# Patient Record
Sex: MALE | NOT HISPANIC OR LATINO | Employment: FULL TIME | URBAN - METROPOLITAN AREA
[De-identification: names, ages, dates, MRNs, and addresses within clinical notes are randomized per-mention and may not be internally consistent; named-entity substitution may affect disease eponyms.]

---

## 2017-03-15 ENCOUNTER — HOSPITAL ENCOUNTER (EMERGENCY)
Facility: CLINIC | Age: 42
Discharge: HOME | End: 2017-03-15
Attending: EMERGENCY MEDICINE | Admitting: EMERGENCY MEDICINE
Payer: COMMERCIAL

## 2017-03-15 ENCOUNTER — APPOINTMENT (OUTPATIENT)
Dept: GENERAL RADIOLOGY | Facility: CLINIC | Age: 42
End: 2017-03-15
Attending: EMERGENCY MEDICINE
Payer: COMMERCIAL

## 2017-03-15 VITALS
OXYGEN SATURATION: 98 % | RESPIRATION RATE: 18 BRPM | TEMPERATURE: 97.8 F | SYSTOLIC BLOOD PRESSURE: 128 MMHG | DIASTOLIC BLOOD PRESSURE: 92 MMHG | HEART RATE: 69 BPM

## 2017-03-15 DIAGNOSIS — M54.9 UPPER BACK PAIN: ICD-10-CM

## 2017-03-15 LAB
ALBUMIN SERPL-MCNC: 4.2 G/DL (ref 3.4–5)
ALP SERPL-CCNC: 81 U/L (ref 40–150)
ALT SERPL W P-5'-P-CCNC: 27 U/L (ref 0–70)
ANION GAP SERPL CALCULATED.3IONS-SCNC: 8 MMOL/L (ref 3–14)
AST SERPL W P-5'-P-CCNC: 15 U/L (ref 0–45)
BASOPHILS # BLD AUTO: 0 10E9/L (ref 0–0.2)
BASOPHILS NFR BLD AUTO: 0.3 %
BILIRUB SERPL-MCNC: 0.5 MG/DL (ref 0.2–1.3)
BUN SERPL-MCNC: 16 MG/DL (ref 7–30)
CALCIUM SERPL-MCNC: 8.6 MG/DL (ref 8.5–10.1)
CHLORIDE SERPL-SCNC: 104 MMOL/L (ref 94–109)
CO2 SERPL-SCNC: 29 MMOL/L (ref 20–32)
CREAT SERPL-MCNC: 0.72 MG/DL (ref 0.66–1.25)
D DIMER PPP FEU-MCNC: NORMAL UG/ML FEU (ref 0–0.5)
DIFFERENTIAL METHOD BLD: NORMAL
EOSINOPHIL # BLD AUTO: 0.2 10E9/L (ref 0–0.7)
EOSINOPHIL NFR BLD AUTO: 2.6 %
ERYTHROCYTE [DISTWIDTH] IN BLOOD BY AUTOMATED COUNT: 13 % (ref 10–15)
GFR SERPL CREATININE-BSD FRML MDRD: NORMAL ML/MIN/1.7M2
GLUCOSE SERPL-MCNC: 92 MG/DL (ref 70–99)
HCT VFR BLD AUTO: 45.5 % (ref 40–53)
HGB BLD-MCNC: 15.6 G/DL (ref 13.3–17.7)
IMM GRANULOCYTES # BLD: 0 10E9/L (ref 0–0.4)
IMM GRANULOCYTES NFR BLD: 0.3 %
INTERPRETATION ECG - MUSE: NORMAL
LYMPHOCYTES # BLD AUTO: 2 10E9/L (ref 0.8–5.3)
LYMPHOCYTES NFR BLD AUTO: 27.2 %
MCH RBC QN AUTO: 29.4 PG (ref 26.5–33)
MCHC RBC AUTO-ENTMCNC: 34.3 G/DL (ref 31.5–36.5)
MCV RBC AUTO: 86 FL (ref 78–100)
MONOCYTES # BLD AUTO: 0.5 10E9/L (ref 0–1.3)
MONOCYTES NFR BLD AUTO: 7.2 %
NEUTROPHILS # BLD AUTO: 4.7 10E9/L (ref 1.6–8.3)
NEUTROPHILS NFR BLD AUTO: 62.4 %
NRBC # BLD AUTO: 0 10*3/UL
NRBC BLD AUTO-RTO: 0 /100
PLATELET # BLD AUTO: 260 10E9/L (ref 150–450)
POTASSIUM SERPL-SCNC: 3.9 MMOL/L (ref 3.4–5.3)
PROT SERPL-MCNC: 8 G/DL (ref 6.8–8.8)
RBC # BLD AUTO: 5.3 10E12/L (ref 4.4–5.9)
SODIUM SERPL-SCNC: 141 MMOL/L (ref 133–144)
TROPONIN I SERPL-MCNC: NORMAL UG/L (ref 0–0.04)
WBC # BLD AUTO: 7.5 10E9/L (ref 4–11)

## 2017-03-15 PROCEDURE — 85025 COMPLETE CBC W/AUTO DIFF WBC: CPT | Performed by: EMERGENCY MEDICINE

## 2017-03-15 PROCEDURE — 93005 ELECTROCARDIOGRAM TRACING: CPT

## 2017-03-15 PROCEDURE — 71020 XR CHEST 2 VW: CPT

## 2017-03-15 PROCEDURE — 84484 ASSAY OF TROPONIN QUANT: CPT | Performed by: EMERGENCY MEDICINE

## 2017-03-15 PROCEDURE — 80053 COMPREHEN METABOLIC PANEL: CPT | Performed by: EMERGENCY MEDICINE

## 2017-03-15 PROCEDURE — 99285 EMERGENCY DEPT VISIT HI MDM: CPT

## 2017-03-15 PROCEDURE — 85379 FIBRIN DEGRADATION QUANT: CPT | Performed by: EMERGENCY MEDICINE

## 2017-03-15 RX ORDER — CYCLOBENZAPRINE HCL 10 MG
10 TABLET ORAL 3 TIMES DAILY PRN
Qty: 20 TABLET | Refills: 0 | Status: SHIPPED | OUTPATIENT
Start: 2017-03-15 | End: 2017-05-12

## 2017-03-15 ASSESSMENT — ENCOUNTER SYMPTOMS
BLOOD IN STOOL: 0
ARTHRALGIAS: 1
ABDOMINAL PAIN: 1
HEMATURIA: 0
SHORTNESS OF BREATH: 0
FEVER: 0
BACK PAIN: 1
DYSURIA: 0

## 2017-03-15 NOTE — ED AVS SNAPSHOT
Emergency Department    [address] [place] [address]    Phone:  [phone]    Fax:  [phone]                                       Tylor Mcgarry   MRN: [id number]    Department:   Emergency Department   Date of Visit:  3/15/2017           After Visit Summary Signature Page     I have received my discharge instructions, and my questions have been answered. I have discussed any challenges I see with this plan with the nurse or doctor.    ..........................................................................................................................................  Patient/Patient Representative Signature      ..........................................................................................................................................  Patient Representative Print Name and Relationship to Patient    ..................................................               ................................................  Date                                            Time    ..........................................................................................................................................  Reviewed by Signature/Title    ...................................................              ..............................................  Date                                                            Time

## 2017-03-15 NOTE — ED PROVIDER NOTES
"  History     Chief Complaint:  Back Pain    HPI:    Tylor Mcgarry is a 41 year old male with a history of hyperlipidemia and migraines who presents to the emergency department with back pain. The patient reports that he has recently been experiencing bilateral upper back pain, which occasionally radiates to his abdomen and upper extremities, for the past week.  He states that the pain worsens with excessive movement, extended periods of sitting, and when he coughs, sneezes, or breathes deeply. He has been taking ibuprofen for his back pain, with little relief. This continued back pain was concerning to him and prompted him to seek evaluation here in the emergency department.      Here in the ED, the patient continues to complain of \"10/10\" back pain.  The patient denies recent chest pain, fever, hematuria, dysuria, blood in his stool, shortness of breath, or trauma to his back. He notes that he is otherwise generally healthy.     Cardiac/PE/DVT Risk Factors:  The patient has a history of hyperlipidemia. He reports no family history of CAD and myocardial infarction. The patient denies any personal or familial history of PE, DVT, or clotting disorder. The patient reports no recent travel, surgery, or other immobilizations.     Allergies:  No known drug allergies      Medications:    Hydrocodone     Past Medical History:    Migraines  Hyperlipidemia    Past Surgical History:    History reviewed. No pertinent surgical history.     Family History:    Hyperlipidemia- Mother, Brother x3, Sister  CAD- Father  Hernias- Father  Liver Disease- Mother, Brother    Social History:  Smoking status: Never Smoker  Alcohol use: No   Marital Status:  Single      Review of Systems   Constitutional: Negative for fever.   Respiratory: Negative for shortness of breath.    Cardiovascular: Negative for chest pain.   Gastrointestinal: Positive for abdominal pain. Negative for blood in stool.   Genitourinary: Negative for dysuria " and hematuria.   Musculoskeletal: Positive for arthralgias and back pain.        Shoulders  Left arm   All other systems reviewed and are negative.      Physical Exam     Patient Vitals for the past 24 hrs:   BP Temp Temp src Pulse Heart Rate Resp SpO2   03/15/17 1644 (!) 128/92 97.8  F (36.6  C) Oral 69 69 18 98 %           Physical Exam:  SKIN:  Warm, dry.  HEMATOLOGIC/IMMUNOLOGIC/LYMPHATIC:  No pallor.  No limb edema.  HENT:  Painless ROM head and neck.  CARDIOVASCULAR:  Regular rate and rhythm.  No murmur.  RESPIRATORY:  No respiratory distress, breath sounds equal and normal.  Inspiration exacerbates the back pain.  GASTROINTESTINAL:  Soft, nontender abdomen.  No distension.  MUSCULOSKELETAL:  ROM torso exacerbates the upper bilateral back pain.  NEUROLOGIC:  Alert, conversant.  No gross motor or sensory deficit.    PSYCHIATRIC:  Normal mood.    Emergency Department Course   ECG:  Indication: Back Pain  Time: 1707  Vent. Rate 65 bpm. NC interval 162. QRS duration 104. QT/QTc 386/401. P-R-T axis 28 -11 -1.  Normal sinus rhythm. Possible Left atrial enlargement. Borderline ECG. Read time: 1715    Imaging:  Radiographic findings were communicated with the patient who voiced understanding of the findings.    XR Chest 2 views:   Clear Lungs As per radiology.     Laboratory:  CBC: WBC: 7.5, HGB: 15.6, PLT: 260  CMP: all WNL (Creatinine: 0.72)    1655 Troponin: <0.015   D Dimer: <0.3    Emergency Department Course:  Nursing notes and vitals reviewed. I performed an exam of the patient as documented above.     EKG obtained in the ED, see results above.     Blood drawn. This was sent to the lab for further testing, results above.    The patient was sent for a Chest XR while in the emergency department, findings above.     1738 I reevaluated the patient and provided an update in regards to his ED course.      Findings and plan explained to the Patient. Patient discharged home with instructions regarding supportive care,  medications, and reasons to return. The importance of close follow-up was reviewed. The patient was prescribed Flexeril.    I personally reviewed the laboratory results with the Patient and answered all related questions prior to discharge.     Impression & Plan    Medical Decision Making:  Tylor Mcgarry is a 41 year old male who has bilateral upper back pain, worsened by inspiration, cough, sneezing, and movement. A number of screening tests were performed regarding cardiopulmonary causes, all of which are negative. I advised to continued ibuprofen, otherwise use the prescribed Flexeril, use a heating pad, and otherwise follow up with primary doctor if not improving. Return if any new acute concerns.     Diagnosis:  1. Upper Back Pain (M54.9)    Disposition:  discharged to home    Discharge Medications:  New Prescriptions    CYCLOBENZAPRINE (FLEXERIL) 10 MG TABLET    Take 1 tablet (10 mg) by mouth 3 times daily as needed for muscle spasms       Allison Fletcher  3/15/2017    EMERGENCY DEPARTMENT    I, Allison Fletcher, am serving as a scribe on 3/15/2017 at 5:08 PM to personally document services performed by Felice Keith MD based on my observations and the provider's statements to me.      Felice Keith MD  03/15/17 1293

## 2017-03-15 NOTE — DISCHARGE INSTRUCTIONS
Relieving Tension in Your Back  Being relaxed helps keep your mind healthy and your back ready to move. Take short breaks often. Walk around. Stretch. Switch tasks. Also give the following a try.  Make time to relax. Start by setting aside 5 minutes daily.   Deep breathing    Deep breathing is a simple way to reduce stress. You can do it almost any time you need to relax.    Inhale slowly through your nose. Let your lungs and stomach expand.    Hold your breath for 2 to 3 seconds.    Exhale slowly through your mouth until your lungs feel empty. Repeat 3 to 4 times.  Relieve tension  Muscle tension can create tender spots called trigger points. The tips below may help relieve muscle tension.    Press the trigger point if you can reach it. If not, lie on a soft tennis ball, or ask a friend to press the spot. Use steady pressure for 10 to 15 seconds. Breathe deeply. Repeat a few times.    Massage trigger points with ice for 2 to 5 minutes. Press lightly at first. Slowly increase firmness.    0752-5577 [employer]. [address] [place] [address], [place] [address]. All rights reserved. This information is not intended as a substitute for professional medical care. Always follow your healthcare professional's instructions.

## 2017-03-15 NOTE — ED AVS SNAPSHOT
Emergency Department    [address] [place] [address]    Phone:  [phone]    Fax:  [phone]                                       Tylor Mcgarry   MRN: [id number]    Department:   Emergency Department   Date of Visit:  3/15/2017           Patient Information     Date Of Birth          1975        Your diagnoses for this visit were:     Upper back pain        You were seen by Robert Mckeon MD, Ignacio Dickson MD, and Felice Keith MD.      Follow-up Information     Please follow up.    Why:  continue ibuprofen for pain and try a heating pad - follow up with your MD if pain persists        Discharge Instructions         Relieving Tension in Your Back  Being relaxed helps keep your mind healthy and your back ready to move. Take short breaks often. Walk around. Stretch. Switch tasks. Also give the following a try.  Make time to relax. Start by setting aside 5 minutes daily.   Deep breathing    Deep breathing is a simple way to reduce stress. You can do it almost any time you need to relax.    Inhale slowly through your nose. Let your lungs and stomach expand.    Hold your breath for 2 to 3 seconds.    Exhale slowly through your mouth until your lungs feel empty. Repeat 3 to 4 times.  Relieve tension  Muscle tension can create tender spots called trigger points. The tips below may help relieve muscle tension.    Press the trigger point if you can reach it. If not, lie on a soft tennis ball, or ask a friend to press the spot. Use steady pressure for 10 to 15 seconds. Breathe deeply. Repeat a few times.    Massage trigger points with ice for 2 to 5 minutes. Press lightly at first. Slowly increase firmness.    6001-9149 [employer]. [address] [place] [address]. All rights reserved. This information is not intended as a substitute for professional medical care. Always follow your healthcare professional's instructions.          24  Hour Appointment Hotline       To make an appointment at any [facility], call [phone] ([phone]). If you don't have a family doctor or clinic, we will help you find one. [facility] are conveniently located to serve the needs of you and your family.             Review of your medicines      START taking        Dose / Directions Last dose taken    cyclobenzaprine 10 MG tablet   Commonly known as:  FLEXERIL   Dose:  10 mg   Quantity:  20 tablet        Take 1 tablet (10 mg) by mouth 3 times daily as needed for muscle spasms   Refills:  0                Prescriptions were sent or printed at these locations (1 Prescription)                   [facility] - [place], [place] - [address] AT [facility]   [address], [facility] [address]    Telephone:  [phone]   Fax:  [phone]   Hours:                  E-Prescribed (1 of 1)         cyclobenzaprine (FLEXERIL) 10 MG tablet                Procedures and tests performed during your visit     CBC with platelets differential    Comprehensive metabolic panel    D dimer quantitative    EKG 12-lead, tracing only    Troponin I    XR Chest 2 Views      Orders Needing Specimen Collection     None      Pending Results     Date and Time Order Name Status Description    3/15/2017 1649 XR Chest 2 Views Preliminary             Pending Culture Results     No orders found from 3/13/2017 to 3/16/2017.             Test Results from your hospital stay     3/15/2017  5:06 PM - Interface, ibeatyou Results      Component Results     Component Value Ref Range & Units Status    WBC 7.5 4.0 - 11.0 10e9/L Final    RBC Count 5.30 4.4 - 5.9 10e12/L Final    Hemoglobin 15.6 13.3 - 17.7 g/dL Final    Hematocrit 45.5 40.0 - 53.0 % Final    MCV 86 78 - 100 fl Final    MCH 29.4 26.5 - 33.0 pg Final    MCHC 34.3 31.5 - 36.5 g/dL Final    RDW 13.0 10.0 - 15.0 % Final    Platelet Count 260 150 - 450 10e9/L Final    Diff Method Automated  Method  Final    % Neutrophils 62.4 % Final    % Lymphocytes 27.2 % Final    % Monocytes 7.2 % Final    % Eosinophils 2.6 % Final    % Basophils 0.3 % Final    % Immature Granulocytes 0.3 % Final    Nucleated RBCs 0 0 /100 Final    Absolute Neutrophil 4.7 1.6 - 8.3 10e9/L Final    Absolute Lymphocytes 2.0 0.8 - 5.3 10e9/L Final    Absolute Monocytes 0.5 0.0 - 1.3 10e9/L Final    Absolute Eosinophils 0.2 0.0 - 0.7 10e9/L Final    Absolute Basophils 0.0 0.0 - 0.2 10e9/L Final    Abs Immature Granulocytes 0.0 0 - 0.4 10e9/L Final    Absolute Nucleated RBC 0.0  Final         3/15/2017  5:22 PM - Interface, Flexilab Results      Component Results     Component Value Ref Range & Units Status    D Dimer  0.0 - 0.50 ug/ml FEU Final    <0.3  This D-dimer assay is intended for use in conjuntion with a clinical pretest   probability assessment model to exclude pulmonary embolism (PE) and as an aid   in the diagnosis of deep venous thrombosis (DVT) in outpatients suspected of PE   or DVT. The cut-off value is 0.5 g/mL FEU.           3/15/2017  5:26 PM - Interface, Flexilab Results      Component Results     Component Value Ref Range & Units Status    Sodium 141 133 - 144 mmol/L Final    Potassium 3.9 3.4 - 5.3 mmol/L Final    Chloride 104 94 - 109 mmol/L Final    Carbon Dioxide 29 20 - 32 mmol/L Final    Anion Gap 8 3 - 14 mmol/L Final    Glucose 92 70 - 99 mg/dL Final    Urea Nitrogen 16 7 - 30 mg/dL Final    Creatinine 0.72 0.66 - 1.25 mg/dL Final    GFR Estimate >90  Non  GFR Calc   >60 mL/min/1.7m2 Final    GFR Estimate If Black >90   GFR Calc   >60 mL/min/1.7m2 Final    Calcium 8.6 8.5 - 10.1 mg/dL Final    Bilirubin Total 0.5 0.2 - 1.3 mg/dL Final    Albumin 4.2 3.4 - 5.0 g/dL Final    Protein Total 8.0 6.8 - 8.8 g/dL Final    Alkaline Phosphatase 81 40 - 150 U/L Final    ALT 27 0 - 70 U/L Final    AST 15 0 - 45 U/L Final         3/15/2017  5:27 PM - Interface, Flexilab Results       Component Results     Component Value Ref Range & Units Status    Troponin I ES  0.000 - 0.045 ug/L Final    <0.015  The 99th percentile for upper reference range is 0.045 ug/L.  Troponin values in   the range of 0.045 - 0.120 ug/L may be associated with risks of adverse   clinical events.           3/15/2017  5:38 PM - Interface, Radiant Ib      Narrative     CHEST TWO VIEW 3/15/2017 5:30 PM     COMPARISON: None.    HISTORY: Back pain, shortness of breath.    FINDINGS: The cardiac silhouette, pulmonary vasculature, lungs and  pleural spaces are within normal limits.        Impression     IMPRESSION: Clear lungs.                Clinical Quality Measure: Blood Pressure Screening     Your blood pressure was checked while you were in the emergency department today. The last reading we obtained was  BP: (!) 128/92 . Please read the guidelines below about what these numbers mean and what you should do about them.  If your systolic blood pressure (the top number) is less than 120 and your diastolic blood pressure (the bottom number) is less than 80, then your blood pressure is normal. There is nothing more that you need to do about it.  If your systolic blood pressure (the top number) is 120-139 or your diastolic blood pressure (the bottom number) is 80-89, your blood pressure may be higher than it should be. You should have your blood pressure rechecked within a year by a primary care provider.  If your systolic blood pressure (the top number) is 140 or greater or your diastolic blood pressure (the bottom number) is 90 or greater, you may have high blood pressure. High blood pressure is treatable, but if left untreated over time it can put you at risk for heart attack, stroke, or kidney failure. You should have your blood pressure rechecked by a primary care provider within the next 4 weeks.  If your provider in the emergency department today gave you specific instructions to follow-up with your doctor or provider even  "sooner than that, you should follow that instruction and not wait for up to 4 weeks for your follow-up visit.        Thank you for choosing [facility]       Thank you for choosing [facility] for your care. Our goal is always to provide you with excellent care. Hearing back from our patients is one way we can continue to improve our services. Please take a few minutes to complete the written survey that you may receive in the mail after you visit with us. Thank you!        [employer] Information     [employer] lets you send messages to your doctor, view your test results, renew your prescriptions, schedule appointments and more. To sign up, go to [email or web address] . Click on \"Log in\" on the left side of the screen, which will take you to the Welcome page. Then click on \"Sign up Now\" on the right side of the page.     You will be asked to enter the access code listed below, as well as some personal information. Please follow the directions to create your username and password.     Your access code is: [email or web address]  Expires: 2017  5:44 PM     Your access code will  in 90 days. If you need help or a new code, please call your [facility] clinic or [phone].        Care EveryWhere ID     This is your Care EveryWhere ID. This could be used by other organizations to access your [facility] medical records  [id number]        After Visit Summary       This is your record. Keep this with you and show to your community pharmacist(s) and doctor(s) at your next visit.                  "

## 2017-04-14 ENCOUNTER — OFFICE VISIT (OUTPATIENT)
Dept: INTERNAL MEDICINE | Facility: CLINIC | Age: 42
End: 2017-04-14
Payer: COMMERCIAL

## 2017-04-14 VITALS
DIASTOLIC BLOOD PRESSURE: 80 MMHG | BODY MASS INDEX: 32.78 KG/M2 | WEIGHT: 221.3 LBS | OXYGEN SATURATION: 98 % | TEMPERATURE: 97.6 F | HEIGHT: 69 IN | HEART RATE: 70 BPM | SYSTOLIC BLOOD PRESSURE: 112 MMHG

## 2017-04-14 DIAGNOSIS — Z00.00 ENCOUNTER FOR ROUTINE ADULT HEALTH EXAMINATION WITHOUT ABNORMAL FINDINGS: Primary | ICD-10-CM

## 2017-04-14 DIAGNOSIS — E78.5 HYPERLIPIDEMIA LDL GOAL <130: ICD-10-CM

## 2017-04-14 PROCEDURE — 99396 PREV VISIT EST AGE 40-64: CPT | Performed by: INTERNAL MEDICINE

## 2017-04-14 NOTE — LETTER
[facility]  [address] [address]  [phone]        April 19, 2018    Tylor Mcgarry  [address]  [facility] [address]              Dear Tylor Mcgarry    This is to remind you that your fasting labs is due.    You may call our office at [phone] to schedule an appointment.    Please disregard this notice if you have already had your labs drawn or made an appointment.        Sincerely,        Urban Stallings MD

## 2017-04-14 NOTE — PATIENT INSTRUCTIONS
"  *    5 GOALS TO PREVENT VASCULAR DISEASE:     1.  Aggressive blood pressure control, under 130/80 ideally.  Using medications if needed.    Your blood pressure is under good control    BP Readings from Last 4 Encounters:   04/14/17 112/80   03/15/17 (!) 128/92   10/20/16 112/78   04/15/16 108/76       2.  Aggressive LDL cholesterol (\"bad cholesterol\") lowering as indicated.    Your goal is an LDL under 130 for sure, preferably under 100.  (If you have diabetes or previous vascular disease, the the LDL goals would be under 100 for sure, preferably under 70.)    New guidelines identify four high-risk groups who could benefit from statins:   *people with pre-existing heart disease, such as those who have had a heart attack;   *people ages 40 to 75 who have diabetes of any type  *patients ages 40 to 75 with at least a 7.5% risk of developing cardiovascular disease over the next decade, according to a formula described in the guidelines  *patients with the sort of super-high cholesterol that sometimes runs in families, as evidenced by an LDL of 190 milligrams per deciliter or higher    Your cholesterol levels are well controlled.    Recent Labs   Lab Test  10/20/16   1014  03/17/16   1038   CHOL  228*  216*   HDL  51  55   LDL  161*  136*   TRIG  78  124       3.  Aggressive diabetic prevention, screening and/or management.      You do not have diabetes as of the most recent blood tests.     4.  No smoking    5.  Consider taking low dose aspirin (81 mg) tablet once per day OVER AGE 50.        Preventive Health Recommendations  Male Ages 40 to 49    Yearly exam:             See your health care provider every year in order to  o   Review health changes.   o   Discuss preventive care.    o   Review your medicines if your doctor has prescribed any.    You should be tested each year for STDs (sexually transmitted diseases) if you re at risk.     Have a cholesterol test every 5 years.     Have a colonoscopy (test for colon " "cancer) if someone in your family has had colon cancer or polyps before age 50.     After age 45, have a diabetes test (fasting glucose). If you are at risk for diabetes, you should have this test every 3 years.      Talk with your health care provider about whether or not a prostate cancer screening test (PSA) is right for you.    Shots: Get a flu shot each year. Get a tetanus shot every 10 years.     Nutrition:    Eat at least 5 servings of fruits and vegetables daily.     Eat whole-grain bread, whole-wheat pasta and brown rice instead of white grains and rice.     Talk to your provider about Calcium and Vitamin D.        --Good Grains:  Oats, brown rice, Quinoa (these do not raise the blood sugar as much)     --Bad grains:  Anything made from wheat or white rice     (because these raise the blood sugars significantly, and the possible gluten issue from wheat for some people).      --Proteins:  Aim for \"lean proteins\" including chicken, fish, seafood, pork, turkey, and eggs (in moderation); Eat red meat only occasionally          [place]:                  Lifestyle    Exercise for at least 150 minutes a week (30 minutes a day, 5 days a week). This will help you control your weight and prevent disease.     Limit alcohol to one drink per day.     No smoking.     Wear sunscreen to prevent skin cancer.     See your dentist every six months for an exam and cleaning.      "

## 2017-04-14 NOTE — NURSING NOTE
"Chief Complaint   Patient presents with     Physical       Initial /80  Pulse 70  Temp 97.6  F (36.4  C) (Oral)  Ht 5' 8.5\" (1.74 m)  Wt 221 lb 4.8 oz (100.4 kg)  SpO2 98%  BMI 33.16 kg/m2 Estimated body mass index is 33.16 kg/(m^2) as calculated from the following:    Height as of this encounter: 5' 8.5\" (1.74 m).    Weight as of this encounter: 221 lb 4.8 oz (100.4 kg).  Medication Reconciliation: complete    "

## 2017-04-14 NOTE — PROGRESS NOTES
SUBJECTIVE:     CC: Tylor Mcgarry is an 41 year old male who presents for preventative health visit.     Healthy Habits:    Do you get at least three servings of calcium containing foods daily (dairy, green leafy vegetables, etc.)? yes    Amount of exercise or daily activities, outside of work: 3 day(s) per week    Problems taking medications regularly not applicable    Medication side effects: No    Have you had an eye exam in the past two years? yes    Do you see a dentist twice per year? yes    Do you have sleep apnea, excessive snoring or daytime drowsiness?yes wakes up early for work            Today's PHQ-2 Score:   PHQ-2 ( 1999 Pf[employer]) 4/14/2017 10/20/2016   Q1: Little interest or pleasure in doing things 0 0   Q2: Feeling down, depressed or hopeless 0 0   PHQ-2 Score 0 0       Abuse: Current or Past(Physical, Sexual or Emotional)- No  Do you feel safe in your environment - Yes    Social History   Substance Use Topics     Smoking status: Never Smoker     Smokeless tobacco: Never Used     Alcohol use No     The patient does not drink >3 drinks per day nor >7 drinks per week.    Last PSA: No results found for: PSA    Recent Labs   Lab Test  10/20/16   1014  03/17/16   1038   CHOL  228*  216*   HDL  51  55   LDL  161*  136*   TRIG  78  124   NHDL  177*  161*       Reviewed orders with patient. Reviewed health maintenance and updated orders accordingly - Yes    Reviewed and updated as needed this visit by clinical staff  Tobacco  Allergies  Soc Hx        Reviewed and updated as needed this visit by Provider        Past Medical History:  ---------------------------  Past Medical History:   Diagnosis Date     Hyperlipidemia LDL goal <130        Past Surgical History:  ---------------------------  No past surgical history on file.    Current Medications:  ---------------------------  Current Outpatient Prescriptions   Medication Sig Dispense Refill     cyclobenzaprine (FLEXERIL) 10 MG tablet Take 1  "tablet (10 mg) by mouth 3 times daily as needed for muscle spasms 20 tablet 0       Allergies:  -------------  No Known Allergies    Social History:  -------------------  Social History     Social History     Marital status: Single     Spouse name: N/A     Number of children: N/A     Years of education: N/A     Occupational History     Not on file.     Social History Main Topics     Smoking status: Never Smoker     Smokeless tobacco: Never Used     Alcohol use No     Drug use: No     Sexual activity: Yes     Partners: Female     Other Topics Concern     Not on file     Social History Narrative       Family Medical History:  ------------------------------  Family History   Problem Relation Age of Onset     Hyperlipidemia Mother      Hyperlipidemia Brother      Hyperlipidemia Brother      Hyperlipidemia Brother      Hyperlipidemia Sister      Coronary Artery Disease Father 55     Prostate Problems Father      hernias     Liver Disease Mother      blanchard     Liver Disease Brother      blanchard        ROS:  C: NEGATIVE for fever, chills, change in weight  I: NEGATIVE for worrisome rashes, moles or lesions  E: NEGATIVE for vision changes or irritation  ENT: NEGATIVE for ear, mouth and throat problems  R: NEGATIVE for significant cough or SOB  CV: NEGATIVE for chest pain, palpitations or peripheral edema  GI: NEGATIVE for nausea, abdominal pain, heartburn, or change in bowel habits   male: negative for dysuria, hematuria, decreased urinary stream, erectile dysfunction, urethral discharge  M: NEGATIVE for significant arthralgias or myalgia  N: NEGATIVE for weakness, dizziness or paresthesias  P: NEGATIVE for changes in mood or affect      OBJECTIVE:     /80  Pulse 70  Temp 97.6  F (36.4  C) (Oral)  Ht 5' 8.5\" (1.74 m)  Wt 221 lb 4.8 oz (100.4 kg)  SpO2 98%  BMI 33.16 kg/m2  EXAM:  GENERAL alert and no distress.  EYES conjunctivae/corneas clear. PERRL, EOM's intact  HENT: NCAT,oral and posterior pharynx without " lesions or erythema, facies symmetric  NECK: Neck supple. No LAD, without thyroidmegaly or JVD.  RESP: Clear to ausculation bilaterally without wheezes or crackles. Normal BS in all fields.  CV: RRR normal S1S2 without  murmurs, rubs or gallops. PMI normal  LYMPH: no cervical lymph adenopathy appreciated  GI: NTND, no organomegaly, normal BS in all quadrants, without rebound or guarding  MS: No cyanosis, clubbing or edema noted bilaterally in Upper and/or Lower Extremities  SKIN: no significant ulcers, lesions or rashes on the visualized portions of the skin  NEURO: Alert and Oriented x 3, Gait normal. Reflexes normal and symmetric. Sensation grossly WNL..  PSYCH: Alert and oriented times 3; speech- coherent , normal rate and volume; able to articulate logical thoughts, able to abstract reason, no tangential thoughts, no hallucinations or delusions, affect- normal     ASSESSMENT/PLAN:       (Z00.00) Encounter for routine adult health examination without abnormal findings  (primary encounter diagnosis)  Comment: Discussed cardiac disease risk factor modification including screening for and treating HTN, lipids, DM, and smoking cessation.  Also discussed age appropriate cancer screening recommendations including testicular, prostate, colon and lung cancer as dictated by age group.  Recommended low fat, low salt diet and moderation in any alcohol intake.  Recommended always using seatbelts when in a car.  Recommended never driving after drinking or riding with someone who has been drinking as well.      Plan:     (E78.5) Hyperlipidemia LDL goal <130  Comment: Discussed current lipid results, previous results (if available) current guidelines (NCEP) for treatment and goals for lipids.  Discussed lifestyle modification, dietary changes (low fat, low simple carb) and regular aerobic exercise.  Discussed the link between dysmetabolic syndrome and impaired glucose tolerance seen in certain patterns of lipids.  Briefly  "discussed medication used for lipid lowering, including the statins are their possible side effects of myalgias, rhabdomyolysis, and liver toxicity.   Plan: Lipid Profile with reflex to direct LDL,         CANCELED: Lipid Profile with reflex to direct         LDL               COUNSELING:  Reviewed preventive health counseling, as reflected in patient instructions       Regular exercise       Healthy diet/nutrition       Vision screening       Hearing screening         reports that he has never smoked. He has never used smokeless tobacco.    Estimated body mass index is 33.16 kg/(m^2) as calculated from the following:    Height as of this encounter: 5' 8.5\" (1.74 m).    Weight as of this encounter: 221 lb 4.8 oz (100.4 kg).       Counseling Resources:  ATP IV Guidelines  Pooled Cohorts Equation Calculator  FRAX Risk Assessment  ICSI Preventive Guidelines  Dietary Guidelines for Americans, 2010  USDA's MyPlate  ASA Prophylaxis  Lung CA Screening    Urban Stallings MD  [facility]  "

## 2017-04-14 NOTE — MR AVS SNAPSHOT
"              After Visit Summary   4/14/2017    Tylor Mcgarry    MRN: [id number]           Patient Information     Date Of Birth          1975        Visit Information        Provider Department      4/14/2017 9:20 AM Urban Stallings MD [facility] Instructions      *    5 GOALS TO PREVENT VASCULAR DISEASE:     1.  Aggressive blood pressure control, under 130/80 ideally.  Using medications if needed.    Your blood pressure is under good control    BP Readings from Last 4 Encounters:   04/14/17 112/80   03/15/17 (!) 128/92   10/20/16 112/78   04/15/16 108/76       2.  Aggressive LDL cholesterol (\"bad cholesterol\") lowering as indicated.    Your goal is an LDL under 130 for sure, preferably under 100.  (If you have diabetes or previous vascular disease, the the LDL goals would be under 100 for sure, preferably under 70.)    New guidelines identify four high-risk groups who could benefit from statins:   *people with pre-existing heart disease, such as those who have had a heart attack;   *people ages 40 to 75 who have diabetes of any type  *patients ages 40 to 75 with at least a 7.5% risk of developing cardiovascular disease over the next decade, according to a formula described in the guidelines  *patients with the sort of super-high cholesterol that sometimes runs in families, as evidenced by an LDL of 190 milligrams per deciliter or higher    Your cholesterol levels are well controlled.    Recent Labs   Lab Test  10/20/16   1014  03/17/16   1038   CHOL  228*  216*   HDL  51  55   LDL  161*  136*   TRIG  78  124       3.  Aggressive diabetic prevention, screening and/or management.      You do not have diabetes as of the most recent blood tests.     4.  No smoking    5.  Consider taking low dose aspirin (81 mg) tablet once per day OVER AGE 50.        Preventive Health Recommendations  Male Ages 40 to 49    Yearly exam:             See your health care " "provider every year in order to  o   Review health changes.   o   Discuss preventive care.    o   Review your medicines if your doctor has prescribed any.    You should be tested each year for STDs (sexually transmitted diseases) if you re at risk.     Have a cholesterol test every 5 years.     Have a colonoscopy (test for colon cancer) if someone in your family has had colon cancer or polyps before age 50.     After age 45, have a diabetes test (fasting glucose). If you are at risk for diabetes, you should have this test every 3 years.      Talk with your health care provider about whether or not a prostate cancer screening test (PSA) is right for you.    Shots: Get a flu shot each year. Get a tetanus shot every 10 years.     Nutrition:    Eat at least 5 servings of fruits and vegetables daily.     Eat whole-grain bread, whole-wheat pasta and brown rice instead of white grains and rice.     Talk to your provider about Calcium and Vitamin D.        --Good Grains:  Oats, brown rice, Quinoa (these do not raise the blood sugar as much)     --Bad grains:  Anything made from wheat or white rice     (because these raise the blood sugars significantly, and the possible gluten issue from wheat for some people).      --Proteins:  Aim for \"lean proteins\" including chicken, fish, seafood, pork, turkey, and eggs (in moderation); Eat red meat only occasionally          Sb [place]:                  Lifestyle    Exercise for at least 150 minutes a week (30 minutes a day, 5 days a week). This will help you control your weight and prevent disease.     Limit alcohol to one drink per day.     No smoking.     Wear sunscreen to prevent skin cancer.     See your dentist every six months for an exam and cleaning.            Follow-ups after your visit        Who to contact     If you have questions or need follow up information about today's clinic visit or your schedule please contact [facility] directly at " "[phone].  Normal or non-critical lab and imaging results will be communicated to you by MyChart, letter or phone within 4 business days after the clinic has received the results. If you do not hear from us within 7 days, please contact the clinic through [employer] or phone. If you have a critical or abnormal lab result, we will notify you by phone as soon as possible.  Submit refill requests through [employer] or call your pharmacy and they will forward the refill request to us. Please allow 3 business days for your refill to be completed.          Additional Information About Your Visit        [employer] Information     [employer] lets you send messages to your doctor, view your test results, renew your prescriptions, schedule appointments and more. To sign up, go to [email or web address] . Click on \"Log in\" on the left side of the screen, which will take you to the Welcome page. Then click on \"Sign up Now\" on the right side of the page.     You will be asked to enter the access code listed below, as well as some personal information. Please follow the directions to create your username and password.     Your access code is: [email or web address]  Expires: 2017  5:44 PM     Your access code will  in 90 days. If you need help or a new code, please call your [place] clinic or [phone].        Care EveryWhere ID     This is your Care EveryWhere ID. This could be used by other organizations to access your [place] medical records  [id number]        Your Vitals Were     Pulse Temperature Height Pulse Oximetry BMI (Body Mass Index)       70 97.6  F (36.4  C) (Oral) 5' 8.5\" (1.74 m) 98% 33.16 kg/m2        Blood Pressure from Last 3 Encounters:   17 112/80   03/15/17 (!) 128/92   10/20/16 112/78    Weight from Last 3 Encounters:   17 221 lb 4.8 oz (100.4 kg)   10/20/16 226 lb 14.4 oz (102.9 kg)   04/15/16 222 lb 3.2 oz (100.8 kg)              Today, you had the following     No orders found for display    "    Primary Care Provider    None Specified       No primary provider on file.        Thank you!     Thank you for choosing [facility]  for your care. Our goal is always to provide you with excellent care. Hearing back from our patients is one way we can continue to improve our services. Please take a few minutes to complete the written survey that you may receive in the mail after your visit with us. Thank you!             Your Updated Medication List - Protect others around you: Learn how to safely use, store and throw away your medicines at www.disposemymeds.org.          This list is accurate as of: 4/14/17 10:36 AM.  Always use your most recent med list.                   Brand Name Dispense Instructions for use    cyclobenzaprine 10 MG tablet    FLEXERIL    20 tablet    Take 1 tablet (10 mg) by mouth 3 times daily as needed for muscle spasms

## 2017-04-15 DIAGNOSIS — E78.5 HYPERLIPEMIA: Primary | ICD-10-CM

## 2017-04-15 LAB
CHOLEST SERPL-MCNC: 248 MG/DL
HDLC SERPL-MCNC: 55 MG/DL
LDLC SERPL CALC-MCNC: 172 MG/DL
NONHDLC SERPL-MCNC: 193 MG/DL
TRIGL SERPL-MCNC: 106 MG/DL

## 2017-04-15 PROCEDURE — 80061 LIPID PANEL: CPT | Performed by: INTERNAL MEDICINE

## 2017-04-15 PROCEDURE — 36415 COLL VENOUS BLD VENIPUNCTURE: CPT | Performed by: INTERNAL MEDICINE

## 2017-05-03 ENCOUNTER — TELEPHONE (OUTPATIENT)
Dept: INTERNAL MEDICINE | Facility: CLINIC | Age: 42
End: 2017-05-03

## 2017-05-03 NOTE — TELEPHONE ENCOUNTER
Reason for Call:  Other call back    Detailed comments: Pt is requesting a letter for his work concerning his frequent bathroom use. His breaks are too far apart to cover his needs.  He has frequent urination and bowel movements.  Pt made an appt with Dr Stallings 5/12 to discuss this.  Pt would like to  the letter at [facility].    Phone Number Patient can be reached at: Home number on file [phone] (home)    Best Time: after 3:30pm    Can we leave a detailed message on this number? YES    Call taken on 5/3/2017 at 4:00 PM by TERESA LARA

## 2017-05-04 NOTE — TELEPHONE ENCOUNTER
Called and left message to call back to clinic.    Need more details regarding the letter.  Can ask pt if he would like to  the letter during his visit with Dr Stallings on 5/12/17.  Currently we don't have anything listed in problem list to draft an letter reg the issue.

## 2017-05-04 NOTE — TELEPHONE ENCOUNTER
Patient called back.  Patient said he will discuss the letter with Dr. Stallings at his appointment on 5/12/17.

## 2017-05-12 ENCOUNTER — OFFICE VISIT (OUTPATIENT)
Dept: INTERNAL MEDICINE | Facility: CLINIC | Age: 42
End: 2017-05-12
Payer: COMMERCIAL

## 2017-05-12 VITALS
HEIGHT: 69 IN | SYSTOLIC BLOOD PRESSURE: 110 MMHG | TEMPERATURE: 97.7 F | HEART RATE: 66 BPM | DIASTOLIC BLOOD PRESSURE: 72 MMHG | BODY MASS INDEX: 32.97 KG/M2 | WEIGHT: 222.6 LBS | OXYGEN SATURATION: 98 %

## 2017-05-12 DIAGNOSIS — R35.0 URINARY FREQUENCY: Primary | ICD-10-CM

## 2017-05-12 DIAGNOSIS — L85.3 DRY SKIN: ICD-10-CM

## 2017-05-12 DIAGNOSIS — K52.9 FREQUENT STOOLS: ICD-10-CM

## 2017-05-12 LAB
ALBUMIN UR-MCNC: NEGATIVE MG/DL
APPEARANCE UR: CLEAR
BILIRUB UR QL STRIP: NEGATIVE
COLOR UR AUTO: YELLOW
CREAT UR-MCNC: 74 MG/DL
GLUCOSE UR STRIP-MCNC: NEGATIVE MG/DL
HGB UR QL STRIP: NEGATIVE
KETONES UR STRIP-MCNC: NEGATIVE MG/DL
LEUKOCYTE ESTERASE UR QL STRIP: NEGATIVE
MICROALBUMIN UR-MCNC: <5 MG/L
MICROALBUMIN/CREAT UR: NORMAL MG/G CR (ref 0–17)
NITRATE UR QL: NEGATIVE
PH UR STRIP: 6 PH (ref 5–7)
SP GR UR STRIP: 1.01 (ref 1–1.03)
TSH SERPL DL<=0.005 MIU/L-ACNC: 1.44 MU/L (ref 0.4–4)
URN SPEC COLLECT METH UR: NORMAL
UROBILINOGEN UR STRIP-ACNC: 0.2 EU/DL (ref 0.2–1)

## 2017-05-12 PROCEDURE — 99214 OFFICE O/P EST MOD 30 MIN: CPT | Performed by: INTERNAL MEDICINE

## 2017-05-12 PROCEDURE — 82043 UR ALBUMIN QUANTITATIVE: CPT | Performed by: INTERNAL MEDICINE

## 2017-05-12 PROCEDURE — 84443 ASSAY THYROID STIM HORMONE: CPT | Performed by: INTERNAL MEDICINE

## 2017-05-12 PROCEDURE — 81003 URINALYSIS AUTO W/O SCOPE: CPT | Performed by: INTERNAL MEDICINE

## 2017-05-12 PROCEDURE — 36415 COLL VENOUS BLD VENIPUNCTURE: CPT | Performed by: INTERNAL MEDICINE

## 2017-05-12 NOTE — MR AVS SNAPSHOT
"              After Visit Summary   5/12/2017    Tylor Mcgarry    MRN: [id number]           Patient Information     Date Of Birth          1975        Visit Information        Provider Department      5/12/2017 8:00 AM Urban Stallings MD [facility]        Today's Diagnoses     Urinary frequency    -  1    Frequent stools        Dry skin          Care Instructions    *  I am not sure what is causing the frequent urination and frequent bowel movements.  Because there are two separate organ systems involved, the completely normal labs, and lack of any other significant symptoms over the past year and the solid stools, I am not suspecting any serious systemic illness that is causing this    *  I am most suspicious of a \"functional issue\" related to stress.     *  Keep caffeine intake under 2 cups per day    *  Consdier a fiber supplement of your choosing.     *  Consider any food intolerances that could be contributing to these symptoms such as dairy, fats, artifical sweetners, carbonated beverages.      *  There are medications that can be used to calm down an overactive bladder, but these have a lot of side effects.       Dermatology to evaluate your dry skin if you cannot make this better with moisturizing shampoo for the scalp and moisturizers for the skin. .    --[facility] [place] [phone]   [email or web address]    --[facility] [phone]   [email or web address]            Follow-ups after your visit        Additional Services     DERMATOLOGY REFERRAL       Your provider has referred you to:     --[facility] [place] [phone]   [email or web address]    --[facility] [phone]   [email or web address]        Please be " "aware that coverage of these services is subject to the terms and limitations of your health insurance plan.  Call member services at your health plan with any benefit or coverage questions.      Please bring the following with you to your appointment:    (1) Any X-Rays, CTs or MRIs which have been performed.  Contact the facility where they were done to arrange for  prior to your scheduled appointment.    (2) List of current medications  (3) This referral request   (4) Any documents/labs given to you for this referral                  Who to contact     If you have questions or need follow up information about today's clinic visit or your schedule please contact [facility] directly at [phone].  Normal or non-critical lab and imaging results will be communicated to you by MyChart, letter or phone within 4 business days after the clinic has received the results. If you do not hear from us within 7 days, please contact the clinic through MyChart or phone. If you have a critical or abnormal lab result, we will notify you by phone as soon as possible.  Submit refill requests through [email or web address] or call your pharmacy and they will forward the refill request to us. Please allow 3 business days for your refill to be completed.          Additional Information About Your Visit        [employer] Information     [email or web address] lets you send messages to your doctor, view your test results, renew your prescriptions, schedule appointments and more. To sign up, go to [email or web address] . Click on \"Log in\" on the left side of the screen, which will take you to the Welcome page. Then click on \"Sign up Now\" on the right side of the page.     You will be asked to enter the access code listed below, as well as some personal information. Please follow the directions to create your username and password.     Your access code is: [email or web address]  Expires: 2017  5:44 PM     Your access code will  in 90 " "days. If you need help or a new code, please call your [place] clinic or [phone].        Care EveryWhere ID     This is your Care EveryWhere ID. This could be used by other organizations to access your [place] medical records  [id number]        Your Vitals Were     Pulse Temperature Height Pulse Oximetry BMI (Body Mass Index)       66 97.7  F (36.5  C) (Oral) 5' 8.5\" (1.74 m) 98% 33.35 kg/m2        Blood Pressure from Last 3 Encounters:   05/12/17 110/72   04/14/17 112/80   03/15/17 (!) 128/92    Weight from Last 3 Encounters:   05/12/17 222 lb 9.6 oz (101 kg)   04/14/17 221 lb 4.8 oz (100.4 kg)   10/20/16 226 lb 14.4 oz (102.9 kg)              We Performed the Following     **TSH with free T4 reflex FUTURE 6mo     *UA reflex to Microscopic and Culture ([facility] and [facility] (except [place] and [place])     Albumin Random Urine Quantitative     DERMATOLOGY REFERRAL        Primary Care Provider Office Phone # Fax #    Urban Stallings [phone]       [facility] [address] [facility] [address]        Thank you!     Thank you for choosing [facility]  for your care. Our goal is always to provide you with excellent care. Hearing back from our patients is one way we can continue to improve our services. Please take a few minutes to complete the written survey that you may receive in the mail after your visit with us. Thank you!             Your Updated Medication List - Protect others around you: Learn how to safely use, store and throw away your medicines at www.disposemymeds.org.      Notice  As of 5/12/2017  8:54 AM    You have not been prescribed any medications.      "

## 2017-05-12 NOTE — NURSING NOTE
"Chief Complaint   Patient presents with     Derm Problem     c/o dry scalp and skin on body, would like referral for dermatologisy     Urinary Problem     frequent BM's and urination. over the past year        Initial /72  Pulse 66  Temp 97.7  F (36.5  C) (Oral)  Ht 5' 8.5\" (1.74 m)  Wt 222 lb 9.6 oz (101 kg)  SpO2 98%  BMI 33.35 kg/m2 Estimated body mass index is 33.35 kg/(m^2) as calculated from the following:    Height as of this encounter: 5' 8.5\" (1.74 m).    Weight as of this encounter: 222 lb 9.6 oz (101 kg).  Medication Reconciliation: complete   Kate Anne CMA      "

## 2017-05-12 NOTE — LETTER
[facility]  [address]  [address]  [phone]          Tylor Hubbard Zeferino  [address]  [facility] [place] [facility] [address]      5/14/2017        Dear Mr. Tylor Hubbard Zeferino:    I am writing to inform you of the results of the laboratory tests you had done recently.    The results of your recent labs are as noted.   URINE:  NORMAL, no infection, no protein    Thyroid:  NORMAL    Your urine and blood test were all within normal limits. Based on the history your gave me and lack of any significant abnormalities on your labs over the past 2 years, there is no evidence to suggest an obvious medical problem to cause your symptoms.  At this point, I think that your symptoms are more functional in nature.  If your symptoms change or worsen in any way, please return to see me.    Thank you for allowing me to participate in your care. If you have any further questions or problems, please contact me via our nurse line at [phone].    Sincerely,          Urban Stallings M.D.  Department of Internal Medicine  [facility]

## 2017-05-12 NOTE — LETTER
[facility]  [address]  [address]  [phone]          Tylor Mcgarry  [address]  [place] [address]      5/12/2017        Dear Mr. Tylor Mcgarry:    I am writing on behalf of Tylor Mcgarry who has been experiencing frequent urgent urination and frequent urgent bowel movements.  We are considering multiple reasons for this and are performing the necessary testing to find the reason for this.  In the meantime, I would ask that he be allowed to use the restroom on a more regular basis when he needs to.      If you have any further questions or problems, please contact me via our nurse line at [phone].    Sincerely,          Urban Stallings M.D.  Department of Internal Medicine  [facility]

## 2017-05-12 NOTE — PROGRESS NOTES
"  SUBJECTIVE:                                                    Tylor Mcgarry is a 41 year old male who presents to clinic today for the following health issues:      Derm Problem     Onset: ongoing    Description:   Location: on scalp and dry skin on body  Character: flakey, dry  Itching (Pruritis): YES    Progression of Symptoms:  intermittent    Accompanying Signs & Symptoms:  Fever: no   Body aches or joint pain: no   Sore throat symptoms: no   Recent cold symptoms: no    History:   Previous similar rash: no     Precipitating factors:   Exposure to similar rash: no   New exposures: None   Recent travel: no     Alleviating factors:  nothing     Therapies Tried and outcome: pt has tried multiple shampoos and conditioners and lotions without relief        Concern - Frequent BM and urination     Onset: over the past year    Description:   After pt eats or drinks, he has to use the restroom right away    Intensity: moderate    Progression of Symptoms:  constant    Accompanying Signs & Symptoms:  Volume doesn't matter, no matter how much liquid/food he eats, it \"goes right thru him\" with formed stools (sometimes soft, sometime hardered, but no diarrhea or liquid stools). Has some constipation very rarely. Denies and painful urination, or inability to empty bladder    No diarrhea, just frequent stools.   No vomiting, no nausea, no GERD, no dysphagia, no melena, no hematochezia.     All of these symptoms have bene occurring since he moved to [place] from [place] 18 months ago.     No weight loss.   Normal labs over past 15 months.     Wt Readings from Last 10 Encounters:   05/12/17 222 lb 9.6 oz (101 kg)   04/14/17 221 lb 4.8 oz (100.4 kg)   10/20/16 226 lb 14.4 oz (102.9 kg)   04/15/16 222 lb 3.2 oz (100.8 kg)   03/17/16 221 lb 14.4 oz (100.7 kg)          Previous history of similar problem:   Over the past yr    Precipitating factors:   Worsened by: any food or drink    Alleviating factors:  Improved by:   Pt " is requesting note for work for accomodation due to frequent bathroom use       Therapies Tried and outcome:           Problem list and histories reviewed & adjusted, as indicated.  Additional history: as documented        Reviewed and updated as needed this visit by clinical staff  Tobacco  Allergies       Reviewed and updated as needed this visit by Provider             Past Medical History:  ---------------------------  Past Medical History:   Diagnosis Date     Hyperlipidemia LDL goal <130        Past Surgical History:  ---------------------------  No past surgical history on file.    Current Medications:  ---------------------------  No current outpatient prescriptions on file.       Allergies:  -------------  No Known Allergies    Social History:  -------------------  Social History     Social History     Marital status: Single     Spouse name: N/A     Number of children: N/A     Years of education: N/A     Occupational History     Not on file.     Social History Main Topics     Smoking status: Never Smoker     Smokeless tobacco: Never Used     Alcohol use No     Drug use: No     Sexual activity: Yes     Partners: Female     Other Topics Concern     Not on file     Social History Narrative       Family Medical History:  ------------------------------  Family History   Problem Relation Age of Onset     Hyperlipidemia Mother      Liver Disease Mother      blanchard     Hyperlipidemia Brother      Hyperlipidemia Brother      Hyperlipidemia Brother      Hyperlipidemia Sister      Coronary Artery Disease Father 55     Prostate Problems Father      hernias     Liver Disease Brother      blanchard         ROS:  REVIEW OF SYSTEMS:    RESP: negative for cough, dyspnea, wheezing, hemoptysis  CV: negative for chest pain, palpitations, PND, CHAVES, orthopnea; reports no changes in their ability to perform physical activity (from cardiovascular standpoint)  GI: negative for dysphagia, N/V, pain, melena, diarrhea and  "constipation  NEURO: negative for numbness/tingling, paralysis, incoordination, or focal weakness     OBJECTIVE:                                                    /72  Pulse 66  Temp 97.7  F (36.5  C) (Oral)  Ht 5' 8.5\" (1.74 m)  Wt 222 lb 9.6 oz (101 kg)  SpO2 98%  BMI 33.35 kg/m2     GENERAL alert and no distress.  EYES conjunctivae/corneas clear. PERRL, EOM's intact  HENT: NCAT,oral and posterior pharynx without lesions or erythema, facies symmetric  NECK: Neck supple. No LAD, without thyroidmegaly or JVD.  RESP: Clear to ausculation bilaterally without wheezes or crackles. Normal BS in all fields.  CV: RRR normal S1S2 without  murmurs, rubs or gallops. PMI normal  LYMPH: no cervical lymph adenopathy appreciated  GI: NTND, no organomegaly, normal BS in all quadrants, without rebound or guarding  MS: No cyanosis, clubbing or edema noted bilaterally in Upper and/or Lower Extremities  SKIN: no significant ulcers, lesions or rashes on the visualized portions of the skin  NEURO: Alert and Oriented x 3, Gait normal. Reflexes normal and symmetric. Sensation grossly WNL..  PSYCH: Alert and oriented times 3; speech- coherent , normal rate and volume; able to articulate logical thoughts, able to abstract reason, no tangential thoughts, no hallucinations or delusions, affect- normal     Results for orders placed or performed in visit on 05/12/17   *UA reflex to Microscopic and Culture ([place] and [facility] (except [place] and [place])   Result Value Ref Range    Color Urine Yellow     Appearance Urine Clear     Glucose Urine Negative NEG mg/dL    Bilirubin Urine Negative NEG    Ketones Urine Negative NEG mg/dL    Specific Gravity Urine 1.015 1.003 - 1.035    Blood Urine Negative NEG    pH Urine 6.0 5.0 - 7.0 pH    Protein Albumin Urine Negative NEG mg/dL    Urobilinogen Urine 0.2 0.2 - 1.0 EU/dL    Nitrite Urine Negative NEG    Leukocyte Esterase Urine Negative NEG    Source Midstream Urine    **TSH " "with free T4 reflex FUTURE 6mo   Result Value Ref Range    TSH 1.44 0.40 - 4.00 mU/L   Albumin Random Urine Quantitative   Result Value Ref Range    Creatinine Urine 74 mg/dL    Albumin Urine mg/L <5 mg/L    Albumin Urine mg/g Cr Unable to calculate due to low value 0 - 17 mg/g Cr         ASSESSMENT/PLAN:                                                      (R35.0) Urinary frequency  (primary encounter diagnosis)  Comment: No evidence for bacterial infection based on exam and history,  no antibiotics indicated.   Suspect functional matter. (similar to the bowel issues, see belwo)  Plan: *UA reflex to Microscopic and Culture ([place]         and [facility] (except [place] and         [place]), **TSH with free T4 reflex FUTURE 6mo,        Albumin Random Urine Quantitative            (K52.9) Frequent stools  Comment: no evidcence for primary GI problem at this time with normal exam, normal labs, and multipel formed stools, no loos or diarrhea.   I am most suspicioous for functional matter.   Plan: **TSH with free T4 reflex FUTURE 6mo            (L85.3) Dry skin  Comment:   Plan: DERMATOLOGY REFERRAL               *  I am not sure what is causing the frequent urination and frequent bowel movements.  Because there are two separate organ systems involved, the completely normal labs, and lack of any other significant symptoms over the past year and the solid stools, I am not suspecting any serious systemic illness that is causing this    *  I am most suspicious of a \"functional issue\" related to stress.     *  Keep caffeine intake under 2 cups per day    *  Consdier a fiber supplement of your choosing.     *  Consider any food intolerances that could be contributing to these symptoms such as dairy, fats, artifical sweetners, carbonated beverages.      *  There are medications that can be used to calm down an overactive bladder, but these have a lot of side effects.       Dermatology to evaluate your dry skin if you " cannot make this better with moisturizing shampoo for the scalp and moisturizers for the skin. .    --[facility] [place] [phone]   [email or web address]    --[facility] Dermatology [facility] [phone]   [email or web address]          See Patient Instructions    ARELI RYDER M.D., MD  [facility]     Spent greater than 25 minutes with pt, greater than 50% of time was educational and counseling.

## 2018-02-01 ENCOUNTER — OFFICE VISIT (OUTPATIENT)
Dept: INTERNAL MEDICINE | Facility: CLINIC | Age: 43
End: 2018-02-01
Payer: COMMERCIAL

## 2018-02-01 VITALS
HEIGHT: 69 IN | WEIGHT: 230.6 LBS | DIASTOLIC BLOOD PRESSURE: 70 MMHG | BODY MASS INDEX: 34.16 KG/M2 | SYSTOLIC BLOOD PRESSURE: 104 MMHG | TEMPERATURE: 97.6 F | HEART RATE: 60 BPM | RESPIRATION RATE: 16 BRPM

## 2018-02-01 DIAGNOSIS — S76.212A GROIN STRAIN, LEFT, INITIAL ENCOUNTER: Primary | ICD-10-CM

## 2018-02-01 DIAGNOSIS — M54.50 BILATERAL LOW BACK PAIN WITHOUT SCIATICA, UNSPECIFIED CHRONICITY: ICD-10-CM

## 2018-02-01 PROCEDURE — 99213 OFFICE O/P EST LOW 20 MIN: CPT | Performed by: INTERNAL MEDICINE

## 2018-02-01 NOTE — PROGRESS NOTES
SUBJECTIVE:   Tylor Mcgarry is a 42 year old male who presents to clinic today for the following health issues:        Abdominal Pain      Duration: x 4 days    Description (location/character/radiation): L lower quad pain that is going down into the lower left leg and also into the L groin area       Associated flank pain: None    Intensity:  mild, severe depends on what the Pt is doing    Accompanying signs and symptoms:        Fever/Chills: no        Gas/Bloating: no        Nausea/vomitting: no        Diarrhea: no        Dysuria or Hematuria: no     History (previous similar pain/trauma/previous testing): No    Precipitating or alleviating factors:       Pain worse with eating/BM/urination: When Pt had a BM on 1/31/18 he states that he did not have the pain as much       Pain relieved by BM: YES    Therapies tried and outcome: None    LMP:  not applicable       Pt states that he has LBP and that it has been going on for years.          Problem list and histories reviewed & adjusted, as indicated.  Additional history: as documented        Reviewed and updated as needed this visit by clinical staff       Reviewed and updated as needed this visit by Provider           Past Medical History:  ---------------------------  Past Medical History:   Diagnosis Date     Hyperlipidemia LDL goal <130        Past Surgical History:  ---------------------------  No past surgical history on file.    Current Medications:  ---------------------------  No current outpatient prescriptions on file.       Allergies:  -------------  No Known Allergies    Social History:  -------------------  Social History     Social History     Marital status: Single     Spouse name: N/A     Number of children: N/A     Years of education: N/A     Occupational History     Not on file.     Social History Main Topics     Smoking status: Never Smoker     Smokeless tobacco: Never Used     Alcohol use No     Drug use: No     Sexual activity: Yes  "    Partners: Female     Other Topics Concern     Not on file     Social History Narrative       Family Medical History:  ------------------------------  Family History   Problem Relation Age of Onset     Hyperlipidemia Mother      Liver Disease Mother      blanchard     Hyperlipidemia Brother      Hyperlipidemia Brother      Hyperlipidemia Brother      Hyperlipidemia Sister      Coronary Artery Disease Father 55     Prostate Problems Father      hernias     Liver Disease Brother      blanchard         ROS:  REVIEW OF SYSTEMS:    RESP: negative for cough, dyspnea, wheezing, hemoptysis  CV: negative for chest pain, palpitations, PND, CHAVES, orthopnea; reports no changes in their ability to perform physical activity (from cardiovascular standpoint)  GI: negative for dysphagia, N/V, pain, melena, diarrhea and constipation  NEURO: negative for numbness/tingling, paralysis, incoordination, or focal weakness     OBJECTIVE:                                                    /70  Pulse 60  Temp 97.6  F (36.4  C) (Oral)  Resp 16  Ht 5' 8.75\" (1.746 m)  Wt 230 lb 9.6 oz (104.6 kg)  BMI 34.3 kg/m2     GENERAL alert and no distress  EYES:  Normal sclera,conjunctiva, EOMI  HENT: oral and posterior pharynx without lesions or erythema, facies symmetric  NECK: Neck supple. No LAD, without thyroidmegaly or JVD., Carotids without bruits.  RESP: Clear to ausculation bilaterally without wheezes or crackles. Normal BS in all fields.  CV: RRR normal S1S2 without murmurs, rubs or gallops. PMI normal  LYMPH: no cervical lymph adenopathy appreciated  MS: extremities- no gross deformities of the visible extremities noted, no edema  PSYCH: Alert and oriented times 3; speech- coherent  SKIN:  No obvious significant skin lesions on visible portions of face   BACK:  Pt appears uncomfortable, but not in severe distress.  Pain to palpation of paraspinal lumbar muscles, muscles in spasm, palpation reproduces pain exactly. straight leg-raises " negative bilaterally.  Able to move on and off the exam table, no obsevred weakness in the feet or legs with walking, standing, or ambulation. Normal reflexes in the achilles and patellar tendons.   GROIN:  No adenopathy, no hernias, pain along inguinal ligament, alogn lower abdominal muscle insertion sites.        ASSESSMENT/PLAN:                                                      (S76.782A) Groin strain, left, initial encounter  (primary encounter diagnosis)  Comment: groin strain most liekly.   Nothing more on exam.   NSAIDs prn.   Plan:     (M54.5) Bilateral low back pain without sciatica, unspecified chronicity  Comment: Discussed typical mechanical back pain, typical causes, and atypical back pain, including red flag symptoms.  Discussed conservative tratments inclduing physical therpy, stretching and strengthening, use of heat and/or ice, NSAIDs with food, antispasmodics where indicated, and the use of narcotic pain meds where indicated.  Gave a Care of Back booklet.   Plan:       See Patient Instructions    ARELI RYDER M.D., MD  [facility]

## 2018-02-01 NOTE — PATIENT INSTRUCTIONS
"*  Lumbar strain/sprain/spasm    *  Groin strain    *  based on your history,  No evidence for herniated disc, spinal stenosis, or vertebral fracture.    *  take over the counter Motrin/Ibuprofen/Advil or Aleve to help relieve pain and inflammation.  follow the directions on the bottle.  Be sure to take with a small meal to prevent stomach upset.      --Motrin 600 mg ( 3 x 200 mg tablets) three times per day every day for 5-7 days, then 2-3 timers per day as needed (take with food to avoid stomach side effects)     OR     --Aleve 2 tablets twice per day for 5-7 days every day, then twice per day as needed     *  do not go out of your way for activity, however, do not avoid basic activates and gentle activity.  Do not lay on the sofa, do not go on bed rest.      *  moist heat as needed ( do not use a heating pad for longer than 20 minutes to lower the risk of burns)    *  avoid any lifting for the next 1 week, then a slow return to activity.  Remember to always use proper lifting technique, always bending at the knees rather than the waist.  Your thigh muscles are MUCH stronger than the smaller muscle of your lower back.    *  Physical therapy through [facility] (many locations throughout the [place] and [facility]) as needed.    *  expect your back to be more easily re-aggravated over the next few months.  the soft tissue and muscles are going to be more easily re-irritated over the next several weeks so be careful to return to physical action slowly.    *  Stretch the groin muscles, stretch the hip flexors, stretch the hamstring, and buttock muscles.     Look for back execises on [facility].  (search \"low back pain rehabilitation exercises\"):  Here are some examples:    --https://s.[place].[facility]/sites/default/files/LowBackPain.pdf     --[email or web address]                      "

## 2018-02-01 NOTE — MR AVS SNAPSHOT
After Visit Summary   2/1/2018    Tylor Mcgarry    MRN: [id number]           Patient Information     Date Of Birth          1975        Visit Information        Provider Department      2/1/2018 10:20 AM Urban Stallings MD [facility] Instructions    *  Lumbar strain/sprain/spasm    *  Groin strain    *  based on your history,  No evidence for herniated disc, spinal stenosis, or vertebral fracture.    *  take over the counter Motrin/Ibuprofen/Advil or Aleve to help relieve pain and inflammation.  follow the directions on the bottle.  Be sure to take with a small meal to prevent stomach upset.      --Motrin 600 mg ( 3 x 200 mg tablets) three times per day every day for 5-7 days, then 2-3 timers per day as needed (take with food to avoid stomach side effects)     OR     --Aleve 2 tablets twice per day for 5-7 days every day, then twice per day as needed     *  do not go out of your way for activity, however, do not avoid basic activates and gentle activity.  Do not lay on the sofa, do not go on bed rest.      *  moist heat as needed ( do not use a heating pad for longer than 20 minutes to lower the risk of burns)    *  avoid any lifting for the next 1 week, then a slow return to activity.  Remember to always use proper lifting technique, always bending at the knees rather than the waist.  Your thigh muscles are MUCH stronger than the smaller muscle of your lower back.    *  Physical therapy through [facility] (many locations throughout the south and [facility]) as needed.    *  expect your back to be more easily re-aggravated over the next few months.  the soft tissue and muscles are going to be more easily re-irritated over the next several weeks so be careful to return to physical action slowly.    *  Stretch the groin muscles, stretch the hip flexors, stretch the hamstring, and buttock muscles.     Look for back execises  "on [employer].  (search \"low back pain rehabilitation exercises\"):  Here are some examples:    --https://[facility].[place].[facility]/sites/default/files/LowBackPain.pdf     --[email or web address]/ncal/[email or web address]_Back_Pain_[email or web address]                              Follow-ups after your visit        Who to contact     If you have questions or need follow up information about today's clinic visit or your schedule please contact [facility] directly at [phone].  Normal or non-critical lab and imaging results will be communicated to you by MyChart, letter or phone within 4 business days after the clinic has received the results. If you do not hear from us within 7 days, please contact the clinic through [employer] or phone. If you have a critical or abnormal lab result, we will notify you by phone as soon as possible.  Submit refill requests through [employer] or call your pharmacy and they will forward the refill request to us. Please allow 3 business days for your refill to be completed.          Additional Information About Your Visit        [employer] Information     [employer] lets you send messages to your doctor, view your test results, renew your prescriptions, schedule appointments and more. To sign up, go to [email or web address] . Click on \"Log in\" on the left side of the screen, which will take you to the Welcome page. Then click on \"Sign up Now\" on the right side of the page.     You will be asked to enter the access code listed below, as well as some personal information. Please follow the directions to create your username and password.     Your access code is: [email or web address]  Expires: 2018 12:04 PM     Your access code will  in 90 days. If you need help or a new code, please call your [facility] or [phone].        Care EveryWhere ID     This is your Care EveryWhere ID. This could be used by other organizations to access your [facility] medical " "records  [id number]        Your Vitals Were     Pulse Temperature Respirations Height BMI (Body Mass Index)       60 97.6  F (36.4  C) (Oral) 16 5' 8.75\" (1.746 m) 34.3 kg/m2        Blood Pressure from Last 3 Encounters:   02/01/18 104/70   05/12/17 110/72   04/14/17 112/80    Weight from Last 3 Encounters:   02/01/18 230 lb 9.6 oz (104.6 kg)   05/12/17 222 lb 9.6 oz (101 kg)   04/14/17 221 lb 4.8 oz (100.4 kg)              Today, you had the following     No orders found for display       Primary Care Provider Office Phone # Fax #    Urban Stallings [phone]       [address] [facility] [address]        Equal Access to Services     JANET [facility] : Hadii cecil ramos hadasho Sodl, waaxda luqadaha, qaybta kaalmada adeegyada, patt iglesias hayamanda [place] . So [facility] [phone].    ATENCIÓN: Si habla español, tiene a figueroa disposición servicios gratuitos de asistencia lingüística. Llame al [phone].    We comply with applicable federal civil rights laws and [place] laws. We do not discriminate on the basis of race, color, national origin, age, disability, sex, sexual orientation, or gender identity.            Thank you!     Thank you for choosing [facility]  for your care. Our goal is always to provide you with excellent care. Hearing back from our patients is one way we can continue to improve our services. Please take a few minutes to complete the written survey that you may receive in the mail after your visit with us. Thank you!             Your Updated Medication List - Protect others around you: Learn how to safely use, store and throw away your medicines at www.disposemymeds.org.      Notice  As of 2/1/2018 12:04 PM    You have not been prescribed any medications.      "

## 2018-06-05 ENCOUNTER — TELEPHONE (OUTPATIENT)
Dept: LAB | Facility: CLINIC | Age: 43
End: 2018-06-05

## 2019-03-23 ENCOUNTER — HOSPITAL ENCOUNTER (EMERGENCY)
Facility: CLINIC | Age: 44
Discharge: HOME | End: 2019-03-24
Attending: EMERGENCY MEDICINE | Admitting: EMERGENCY MEDICINE
Payer: COMMERCIAL

## 2019-03-23 DIAGNOSIS — R11.10 VOMITING AND DIARRHEA: ICD-10-CM

## 2019-03-23 DIAGNOSIS — R19.7 VOMITING AND DIARRHEA: ICD-10-CM

## 2019-03-23 LAB
ALBUMIN SERPL-MCNC: 3.5 G/DL (ref 3.4–5)
ALBUMIN UR-MCNC: NEGATIVE MG/DL
ALP SERPL-CCNC: 80 U/L (ref 40–150)
ALT SERPL W P-5'-P-CCNC: 36 U/L (ref 0–70)
ANION GAP SERPL CALCULATED.3IONS-SCNC: 7 MMOL/L (ref 3–14)
APPEARANCE UR: CLEAR
AST SERPL W P-5'-P-CCNC: 19 U/L (ref 0–45)
BASOPHILS # BLD AUTO: 0 10E9/L (ref 0–0.2)
BASOPHILS NFR BLD AUTO: 0.2 %
BILIRUB SERPL-MCNC: 0.4 MG/DL (ref 0.2–1.3)
BILIRUB UR QL STRIP: NEGATIVE
BUN SERPL-MCNC: 21 MG/DL (ref 7–30)
CALCIUM SERPL-MCNC: 8.2 MG/DL (ref 8.5–10.1)
CHLORIDE SERPL-SCNC: 107 MMOL/L (ref 94–109)
CO2 SERPL-SCNC: 25 MMOL/L (ref 20–32)
COLOR UR AUTO: YELLOW
CREAT SERPL-MCNC: 0.81 MG/DL (ref 0.66–1.25)
DIFFERENTIAL METHOD BLD: NORMAL
EOSINOPHIL # BLD AUTO: 0.2 10E9/L (ref 0–0.7)
EOSINOPHIL NFR BLD AUTO: 2.3 %
ERYTHROCYTE [DISTWIDTH] IN BLOOD BY AUTOMATED COUNT: 13.2 % (ref 10–15)
GFR SERPL CREATININE-BSD FRML MDRD: >90 ML/MIN/{1.73_M2}
GLUCOSE SERPL-MCNC: 95 MG/DL (ref 70–99)
GLUCOSE UR STRIP-MCNC: NEGATIVE MG/DL
HCT VFR BLD AUTO: 41 % (ref 40–53)
HGB BLD-MCNC: 13.7 G/DL (ref 13.3–17.7)
HGB UR QL STRIP: ABNORMAL
IMM GRANULOCYTES # BLD: 0 10E9/L (ref 0–0.4)
IMM GRANULOCYTES NFR BLD: 0.2 %
KETONES UR STRIP-MCNC: NEGATIVE MG/DL
LEUKOCYTE ESTERASE UR QL STRIP: NEGATIVE
LIPASE SERPL-CCNC: 117 U/L (ref 73–393)
LYMPHOCYTES # BLD AUTO: 2 10E9/L (ref 0.8–5.3)
LYMPHOCYTES NFR BLD AUTO: 24.4 %
MCH RBC QN AUTO: 28.2 PG (ref 26.5–33)
MCHC RBC AUTO-ENTMCNC: 33.4 G/DL (ref 31.5–36.5)
MCV RBC AUTO: 84 FL (ref 78–100)
MONOCYTES # BLD AUTO: 1 10E9/L (ref 0–1.3)
MONOCYTES NFR BLD AUTO: 11.8 %
NEUTROPHILS # BLD AUTO: 5.1 10E9/L (ref 1.6–8.3)
NEUTROPHILS NFR BLD AUTO: 61.1 %
NITRATE UR QL: NEGATIVE
NRBC # BLD AUTO: 0 10*3/UL
NRBC BLD AUTO-RTO: 0 /100
PH UR STRIP: 6 PH (ref 5–7)
PLATELET # BLD AUTO: 258 10E9/L (ref 150–450)
POTASSIUM SERPL-SCNC: 3.5 MMOL/L (ref 3.4–5.3)
PROT SERPL-MCNC: 7.3 G/DL (ref 6.8–8.8)
RBC # BLD AUTO: 4.86 10E12/L (ref 4.4–5.9)
RBC #/AREA URNS AUTO: 1 /HPF (ref 0–2)
SODIUM SERPL-SCNC: 139 MMOL/L (ref 133–144)
SOURCE: ABNORMAL
SP GR UR STRIP: 1.02 (ref 1–1.03)
SQUAMOUS #/AREA URNS AUTO: <1 /HPF (ref 0–1)
UROBILINOGEN UR STRIP-MCNC: NORMAL MG/DL (ref 0–2)
WBC # BLD AUTO: 8.3 10E9/L (ref 4–11)
WBC #/AREA URNS AUTO: <1 /HPF (ref 0–5)

## 2019-03-23 PROCEDURE — 85025 COMPLETE CBC W/AUTO DIFF WBC: CPT | Performed by: EMERGENCY MEDICINE

## 2019-03-23 PROCEDURE — 99283 EMERGENCY DEPT VISIT LOW MDM: CPT | Mod: 25

## 2019-03-23 PROCEDURE — 87506 IADNA-DNA/RNA PROBE TQ 6-11: CPT | Performed by: EMERGENCY MEDICINE

## 2019-03-23 PROCEDURE — 25000128 H RX IP 250 OP 636: Performed by: EMERGENCY MEDICINE

## 2019-03-23 PROCEDURE — 80053 COMPREHEN METABOLIC PANEL: CPT | Performed by: EMERGENCY MEDICINE

## 2019-03-23 PROCEDURE — 81001 URINALYSIS AUTO W/SCOPE: CPT | Performed by: EMERGENCY MEDICINE

## 2019-03-23 PROCEDURE — 83690 ASSAY OF LIPASE: CPT | Performed by: EMERGENCY MEDICINE

## 2019-03-23 PROCEDURE — 96360 HYDRATION IV INFUSION INIT: CPT

## 2019-03-23 RX ORDER — ONDANSETRON 2 MG/ML
4 INJECTION INTRAMUSCULAR; INTRAVENOUS EVERY 30 MIN PRN
Status: DISCONTINUED | OUTPATIENT
Start: 2019-03-23 | End: 2019-03-24 | Stop reason: HOSPADM

## 2019-03-23 RX ORDER — SODIUM CHLORIDE 9 MG/ML
1000 INJECTION, SOLUTION INTRAVENOUS CONTINUOUS
Status: DISCONTINUED | OUTPATIENT
Start: 2019-03-23 | End: 2019-03-24 | Stop reason: HOSPADM

## 2019-03-23 RX ADMIN — SODIUM CHLORIDE 1000 ML: 9 INJECTION, SOLUTION INTRAVENOUS at 23:08

## 2019-03-23 ASSESSMENT — MIFFLIN-ST. JEOR: SCORE: 1964.94

## 2019-03-23 NOTE — ED AVS SNAPSHOT
Emergency Department  [address] [place] [id number]  Phone:  [phone]  Fax:  [phone]                                    Tylor Mcgarry   MRN: [id number]    Department:   Emergency Department   Date of Visit:  3/23/2019           After Visit Summary Signature Page    I have received my discharge instructions, and my questions have been answered. I have discussed any challenges I see with this plan with the nurse or doctor.    ..........................................................................................................................................  Patient/Patient Representative Signature      ..........................................................................................................................................  Patient Representative Print Name and Relationship to Patient    ..................................................               ................................................  Date                                   Time    ..........................................................................................................................................  Reviewed by Signature/Title    ...................................................              ..............................................  Date                                               Time          22EPIC Rev 08/18

## 2019-03-24 VITALS
BODY MASS INDEX: 35.25 KG/M2 | OXYGEN SATURATION: 97 % | WEIGHT: 238 LBS | DIASTOLIC BLOOD PRESSURE: 72 MMHG | RESPIRATION RATE: 16 BRPM | HEART RATE: 71 BPM | TEMPERATURE: 98.1 F | HEIGHT: 69 IN | SYSTOLIC BLOOD PRESSURE: 110 MMHG

## 2019-03-24 PROCEDURE — 25800030 ZZH RX IP 258 OP 636: Performed by: EMERGENCY MEDICINE

## 2019-03-24 PROCEDURE — 96361 HYDRATE IV INFUSION ADD-ON: CPT

## 2019-03-24 RX ORDER — ONDANSETRON 4 MG/1
4 TABLET, ORALLY DISINTEGRATING ORAL EVERY 8 HOURS PRN
Qty: 10 TABLET | Refills: 0 | Status: SHIPPED | OUTPATIENT
Start: 2019-03-24 | End: 2019-03-27

## 2019-03-24 RX ADMIN — SODIUM CHLORIDE 1000 ML: 9 INJECTION, SOLUTION INTRAVENOUS at 00:05

## 2019-03-24 ASSESSMENT — ENCOUNTER SYMPTOMS
ABDOMINAL PAIN: 0
DIARRHEA: 1
NAUSEA: 1
VOMITING: 1

## 2019-03-24 NOTE — ED PROVIDER NOTES
"  History     Chief Complaint:  Nausea, emesis, diarrhea    HPI   Tylor Mcgarry is a 43 year old male who presents with concern for nausea vomiting and diarrhea. The patient reports that for two days and nights he has had had intermittent episodes of nausea, emesis, and diarrhea, which improved this morning, but then worsened throughout the course of the day and began to interfere with his sleep tonight, prompting his evaluation here. He states he is unable to take PO secondary to nausea. He denies any abdominal pain this evening.     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  Father sent a gift of cheese from [place], but wife ate as well, she has no symptoms.    Allergies:  NKDA    Medications:    The patient is currently on no regular medications.    Past Medical History:    HLD  Ocular migraine    Past Surgical History:    The patient does not have any pertinent past surgical history.    Family History:    HLD  Liver disease  CAD    Social History:  The patient denies tobacco or alcohol use.    Review of Systems   Gastrointestinal: Positive for diarrhea, nausea and vomiting. Negative for abdominal pain.   All other systems reviewed and are negative.    Physical Exam     Patient Vitals for the past 24 hrs:   BP Temp Temp src Pulse Resp SpO2 Height Weight   03/24/19 0035 110/72 -- -- 71 16 97 % -- --   03/23/19 2158 111/71 98.1  F (36.7  C) Oral 76 14 96 % 1.753 m (5' 9\") 108 kg (238 lb)         Physical Exam  Vitals: reviewed by me  General: Pt seen on [facility], cooperative, and alert to conversation  Eyes: Tracking well, clear conjunctiva BL  ENT: MMM, midline trachea.   Lungs:  No tachypnea, no accessory muscle use. No respiratory distress.   CV: Rate as above, regular rhythm.    Abd: Soft, non tender, no guarding, no rebound. Non distended  MSK: no peripheral edema " or joint effusion.  No evidence of trauma  Skin: No rash, normal turgor and temperature  Neuro: Clear speech and no facial droop.  Psych: Not RIS, no e/o AH/VH      Emergency Department Course     Laboratory:  CBC: WBC: 8.3, HGB: 13.7, PLT: 258  CMP:  Calcium: 8.2, o/w WNL (Creatinine: 0.81)  Lipase: 117    UA with Microscopic: blood: trace, o/w WNL    Enteric bacteria: PENDING    Interventions:    2308 NS 1L IV  0005 NS 1L IV    Emergency Department Course:  Nursing notes and vitals reviewed. (2304) I performed an exam of the patient as documented above.     IV inserted. Medicine administered as documented above. Blood drawn. This was sent to the lab for further testing, results above.    (0036) I rechecked the patient and discussed the results of his workup thus far.     Findings and plan explained to the Patient. Patient discharged home with instructions regarding supportive care, medications, and reasons to return. The importance of close follow-up was reviewed. The patient was prescribed Zofran.     I personally reviewed the laboratory results with the Patient and answered all related questions prior to discharge.       Impression & Plan      Medical Decision Making:  Zeferino is a 43 year old presenting to ED with what appears to be gastroenteritis, . I believe this diagnosis is supported by the presence of significant amount of diarrhea as well as vomiting. He has no risk factors for diarrhea as he has no recent antibiotic use, no recent travel outside [place], and here in ED he has benign abdominal exam at time of arrival and discharge. He has refused a Zofran as he states he feels quite well after fluids only and again with benign abdomen at time of discharge I think it is ok for him to go home. I see no evidence of appendicitis, torsion, or other untoward abdominal abnormality. Will discharge as above. Wife at bedside is ok with plan too.     Diagnosis:    ICD-10-CM    1. Vomiting and diarrhea R11.10     R19.7         Disposition:  discharged to home    Discharge Medications:     Medication List      Started    ondansetron 4 MG ODT tab  Commonly known as:  ZOFRAN ODT  4 mg, Oral, EVERY 8 HOURS PRN          Scribe Disclosure:  I, Dario Macias, am serving as a scribe on 3/24/2019 at 12:36 AM to personally document services performed by Sb Amador* based on my observations and the provider's statements to me.       Dario Macias  3/23/2019    EMERGENCY DEPARTMENT       Sb Amador MD  03/24/19 0518

## 2020-01-16 ENCOUNTER — TELEPHONE (OUTPATIENT)
Dept: INTERNAL MEDICINE | Facility: CLINIC | Age: 45
End: 2020-01-16

## 2020-01-16 ENCOUNTER — MEDICAL CORRESPONDENCE (OUTPATIENT)
Dept: HEALTH INFORMATION MANAGEMENT | Facility: CLINIC | Age: 45
End: 2020-01-16

## 2020-01-16 NOTE — TELEPHONE ENCOUNTER
Received request for orders for continued PT for wrist and low back pain through [facility].   Started out there for right wrist and low back pain through work comp injury, but wishes to continue PT on his own insurance, so they need order from me.      Order signed, faxed to [facility]/.

## 2020-01-22 ENCOUNTER — OFFICE VISIT (OUTPATIENT)
Dept: INTERNAL MEDICINE | Facility: CLINIC | Age: 45
End: 2020-01-22
Payer: COMMERCIAL

## 2020-01-22 VITALS
WEIGHT: 244.1 LBS | OXYGEN SATURATION: 98 % | TEMPERATURE: 98.5 F | HEART RATE: 69 BPM | SYSTOLIC BLOOD PRESSURE: 108 MMHG | DIASTOLIC BLOOD PRESSURE: 72 MMHG | BODY MASS INDEX: 36.15 KG/M2 | HEIGHT: 69 IN

## 2020-01-22 DIAGNOSIS — Z00.00 ROUTINE GENERAL MEDICAL EXAMINATION AT A HEALTH CARE FACILITY: Primary | ICD-10-CM

## 2020-01-22 DIAGNOSIS — M54.50 BILATERAL LOW BACK PAIN WITHOUT SCIATICA, UNSPECIFIED CHRONICITY: ICD-10-CM

## 2020-01-22 DIAGNOSIS — Z13.6 CARDIOVASCULAR SCREENING; LDL GOAL LESS THAN 130: ICD-10-CM

## 2020-01-22 DIAGNOSIS — E78.5 HYPERLIPIDEMIA LDL GOAL <130: ICD-10-CM

## 2020-01-22 DIAGNOSIS — E66.01 SEVERE OBESITY (BMI 35.0-35.9 WITH COMORBIDITY) (H): ICD-10-CM

## 2020-01-22 LAB
ALT SERPL W P-5'-P-CCNC: 38 U/L (ref 0–70)
ANION GAP SERPL CALCULATED.3IONS-SCNC: 3 MMOL/L (ref 3–14)
AST SERPL W P-5'-P-CCNC: 17 U/L (ref 0–45)
BUN SERPL-MCNC: 14 MG/DL (ref 7–30)
CALCIUM SERPL-MCNC: 9.1 MG/DL (ref 8.5–10.1)
CHLORIDE SERPL-SCNC: 105 MMOL/L (ref 94–109)
CHOLEST SERPL-MCNC: 276 MG/DL
CO2 SERPL-SCNC: 29 MMOL/L (ref 20–32)
CREAT SERPL-MCNC: 0.74 MG/DL (ref 0.66–1.25)
ERYTHROCYTE [DISTWIDTH] IN BLOOD BY AUTOMATED COUNT: 13.4 % (ref 10–15)
GFR SERPL CREATININE-BSD FRML MDRD: >90 ML/MIN/{1.73_M2}
GLUCOSE SERPL-MCNC: 97 MG/DL (ref 70–99)
HCT VFR BLD AUTO: 45.6 % (ref 40–53)
HDLC SERPL-MCNC: 44 MG/DL
HGB BLD-MCNC: 15.1 G/DL (ref 13.3–17.7)
LDLC SERPL CALC-MCNC: 191 MG/DL
MCH RBC QN AUTO: 28.1 PG (ref 26.5–33)
MCHC RBC AUTO-ENTMCNC: 33.1 G/DL (ref 31.5–36.5)
MCV RBC AUTO: 85 FL (ref 78–100)
NONHDLC SERPL-MCNC: 232 MG/DL
PLATELET # BLD AUTO: 319 10E9/L (ref 150–450)
POTASSIUM SERPL-SCNC: 3.9 MMOL/L (ref 3.4–5.3)
RBC # BLD AUTO: 5.38 10E12/L (ref 4.4–5.9)
SODIUM SERPL-SCNC: 137 MMOL/L (ref 133–144)
TRIGL SERPL-MCNC: 206 MG/DL
WBC # BLD AUTO: 7.2 10E9/L (ref 4–11)

## 2020-01-22 PROCEDURE — 80048 BASIC METABOLIC PNL TOTAL CA: CPT | Performed by: INTERNAL MEDICINE

## 2020-01-22 PROCEDURE — 99396 PREV VISIT EST AGE 40-64: CPT | Performed by: INTERNAL MEDICINE

## 2020-01-22 PROCEDURE — 80061 LIPID PANEL: CPT | Performed by: INTERNAL MEDICINE

## 2020-01-22 PROCEDURE — 84460 ALANINE AMINO (ALT) (SGPT): CPT | Performed by: INTERNAL MEDICINE

## 2020-01-22 PROCEDURE — 36415 COLL VENOUS BLD VENIPUNCTURE: CPT | Performed by: INTERNAL MEDICINE

## 2020-01-22 PROCEDURE — 85027 COMPLETE CBC AUTOMATED: CPT | Performed by: INTERNAL MEDICINE

## 2020-01-22 PROCEDURE — 84450 TRANSFERASE (AST) (SGOT): CPT | Performed by: INTERNAL MEDICINE

## 2020-01-22 RX ORDER — ROSUVASTATIN CALCIUM 20 MG/1
20 TABLET, COATED ORAL DAILY
Qty: 90 TABLET | Refills: 0 | Status: SHIPPED | OUTPATIENT
Start: 2020-01-22 | End: 2020-10-27

## 2020-01-22 ASSESSMENT — MIFFLIN-ST. JEOR: SCORE: 1987.61

## 2020-01-22 NOTE — PROGRESS NOTES
SUBJECTIVE:   CC: Tylor Mcgarry is an 44 year old male who presents for preventative health visit.     Healthy Habits:     Getting at least 3 servings of Calcium per day:  Yes    Bi-annual eye exam:  NO    Dental care twice a year:  Yes    Sleep apnea or symptoms of sleep apnea:  None    Diet:  Regular (no restrictions)    Frequency of exercise:  1 day/week    Duration of exercise:  Less than 15 minutes    Taking medications regularly:  Yes    Medication side effects:  None    PHQ-2 Total Score: 0    Additional concerns today:  Yes      1.  Long history of hyperlipidemia.  Was briefly on a statin for a time, denied side effects on the medication.  He stopped it and a desire to get off all medicines.  He has been trying to change his diet to lower fat.  Strong family history of hyperlipidemia and most family members.    Latest labs reviewed:    Recent Labs   Lab Test 04/15/17  0951 10/20/16  1014   CHOL 248* 228*   HDL 55 51   * 161*   TRIG 106 78        Lab Results   Component Value Date    AST 19 03/23/2019         2.  Recent ongoing low back pain and wrist pain occurring from an incident at work.  Was referred to physical therapy found it helpful.  He contact our office for additional referral visits for physical therapy for his lower back.  He feels it is helping.  He is working on strengthening and stretching and mobility exercises with fair results.  No radicular symptoms, no bowel or bladder dysfunction.  No weakness or focal neurological compromise.    Today's PHQ-2 Score:   PHQ-2 ( 1999 Pf[employer]) 1/22/2020   Q1: Little interest or pleasure in doing things 0   Q2: Feeling down, depressed or hopeless 0   PHQ-2 Score 0   Q1: Little interest or pleasure in doing things Not at all   Q2: Feeling down, depressed or hopeless Not at all   PHQ-2 Score 0       Abuse: Current or Past(Physical, Sexual or Emotional)- No  Do you feel safe in your environment? Yes        Social History     Tobacco Use      Smoking status: Never Smoker     Smokeless tobacco: Never Used   Substance Use Topics     Alcohol use: No     Alcohol/week: 0.0 standard drinks         Alcohol Use 1/22/2020   Prescreen: >3 drinks/day or >7 drinks/week? No   Prescreen: >3 drinks/day or >7 drinks/week? -   No flowsheet data found.    Last PSA: No results found for: PSA    Reviewed orders with patient. Reviewed health maintenance and updated orders accordingly - Yes      Reviewed and updated as needed this visit by clinical staff  Tobacco  Allergies  Meds  Problems  Med Hx  Surg Hx  Fam Hx         Reviewed and updated as needed this visit by Provider  Tobacco  Allergies  Meds  Problems  Med Hx  Surg Hx  Fam Hx          Past Medical History:  ---------------------------  Past Medical History:   Diagnosis Date     Hyperlipidemia LDL goal <130 2017           Past Surgical History:  ---------------------------  History reviewed. No pertinent surgical history.    Current Medications:  ---------------------------  No current outpatient medications on file.       Allergies:  -------------  No Known Allergies    Social History:  -------------------  Social History     Socioeconomic History     Marital status:      Spouse name: Not on file     Number of children: Not on file     Years of education: Not on file     Highest education level: Not on file   Occupational History     Not on file   Social Needs     Financial resource strain: Not on file     Food insecurity:     Worry: Not on file     Inability: Not on file     Transportation needs:     Medical: Not on file     Non-medical: Not on file   Tobacco Use     Smoking status: Never Smoker     Smokeless tobacco: Never Used   Substance and Sexual Activity     Alcohol use: No     Alcohol/week: 0.0 standard drinks     Drug use: No     Sexual activity: Yes     Partners: Female   Lifestyle     Physical activity:     Days per week: Not on file     Minutes per session: Not on file      "Stress: Not on file   Relationships     Social connections:     Talks on phone: Not on file     Gets together: Not on file     Attends [employer] service: Not on file     Active member of club or organization: Not on file     Attends meetings of clubs or organizations: Not on file     Relationship status: Not on file     Intimate partner violence:     Fear of current or ex partner: Not on file     Emotionally abused: Not on file     Physically abused: Not on file     Forced sexual activity: Not on file   Other Topics Concern     Not on file   Social History Narrative     Not on file       Family Medical History:  ------------------------------  Family History   Problem Relation Age of Onset     Hyperlipidemia Mother      Hyperlipidemia Brother      Hyperlipidemia Sister      Coronary Artery Disease Father 55     Prostate Problems Father         hernias     Hyperlipidemia Brother         Review of Systems  CONSTITUTIONAL: NEGATIVE for fever, chills, change in weight  INTEGUMENTARY/SKIN: NEGATIVE for worrisome rashes, moles or lesions  EYES: NEGATIVE for vision changes or irritation  ENT: NEGATIVE for ear, mouth and throat problems  RESP: NEGATIVE for significant cough or SOB  CV: NEGATIVE for chest pain, palpitations or peripheral edema  GI: NEGATIVE for nausea, abdominal pain, heartburn, or change in bowel habits   male: negative for dysuria, hematuria, decreased urinary stream, erectile dysfunction, urethral discharge  MUSCULOSKELETAL: NEGATIVE for significant arthralgias or myalgia  NEURO: NEGATIVE for weakness, dizziness or paresthesias  PSYCHIATRIC: NEGATIVE for changes in mood or affect    OBJECTIVE:   /72   Pulse 69   Temp 98.5  F (36.9  C) (Temporal)   Ht 1.753 m (5' 9\")   Wt 110.7 kg (244 lb 1.6 oz)   SpO2 98%   BMI 36.05 kg/m      Physical Exam  GENERAL alert and no distress  EYES:  Normal sclera,conjunctiva, EOMI  HENT: oral and posterior pharynx without lesions or erythema, facies " symmetric  NECK: Neck supple. No LAD, without thyroidmegaly.  RESP: Clear to ausculation bilaterally without wheezes or crackles. Normal BS in all fields.  CV: RRR normal S1S2 without murmurs, rubs or gallops.  LYMPH: no cervical lymph adenopathy appreciated  MS: extremities- no gross deformities of the visible extremities noted,   EXT:  no lower extremity edema  PSYCH: Alert and oriented times 3; speech- coherent  SKIN:  No obvious significant skin lesions on visible portions of face   BACK:  Pt appears uncomfortable, but not in severe distress.  Pain to palpation of paraspinal lumbar muscles, muscles in spasm, palpation reproduces pain exactly. straight leg-raises negative bilaterally.  Able to move on and off the exam table, no obsevred weakness in the feet or legs with walking, standing, or ambulation. Normal reflexes in the achilles and patellar tendons.       ASSESSMENT/PLAN:     (Z00.00) Routine general medical examination at a health care facility  (primary encounter diagnosis)  Comment: Discussed cardiac disease risk factor modification including screening for and treating HTN, lipids, DM, and smoking cessation.  Also discussed age appropriate cancer screening recommendations including testicular, prostate, colon and lung cancer as dictated by age group.  Recommended low fat, low salt diet and moderation in any alcohol intake.  Recommended always using seatbelts when in a car.  Recommended never driving after drinking or riding with someone who has been drinking as well.       Plan:     (E78.5) Hyperlipidemia LDL goal <130  Comment: Recheck labs, if LDL above 160, initiate statin therapy.  Patient is a very strong history of hyperlipidemia already making diet changes.  Reviewed side effects of medication.  Discussed current lipid results, previous results (if available) current guidelines (NCEP) for treatment and goals for lipids.  Discussed lifestyle modification, dietary changes (low fat, low simple carb)  "and regular aerobic exercise.  Discussed the link between dysmetabolic syndrome and impaired glucose tolerance seen in certain patterns of lipids.  Briefly discussed medication used for lipid lowering, including the statins are their possible side effects of myalgias, rhabdomyolysis, and liver toxicity.   Restarted medicine, Return for a \"lab only appointment\" in approximately 3 months.  Please get these labs done in a fasting state with nothing to eat for at least 8 hours prior to getting labs drawn.  I will make contact regarding the results and any recommendations once I have reviewed them. .  Plan: Lipid panel reflex to direct LDL Fasting, CBC         with platelets, Basic metabolic panel, AST, ALT            (E66.01,  Z68.35) Severe obesity (BMI 35.0-35.9 with comorbidity) (H)  Comment: The patient's current BMI is: Body mass index is 36.05 kg/m ..  Reviewed their weight patterns.   Discussed obesity as a biological preventable and treatable disease, which is associated with significantly increased risk of many acute and chronic health conditions. Obesity has now been recognized as a chronic disease by the American Medical Association.  Discussed serious co-morbidities associated with obesity including increased risk for hypertension, stroke, coronary artery disease, dyslipidemia, Type II diabetes, depression, sleep apnea, cancers of the colon, breast and endometrium, obstructive sleep apnea, osteoarthritis and female infertility.  Recommended regular aerobic exercise, recommended improved diet aiming at lowering amount of processed foods, lower sugars, and lower wheat products, and moderation carbs and fat in the diet, establishing more regular meal times, always eating breakfast, front loading some of the calories and adding more protein to the diet.     Briefly discussed bariatric surgery as a consideration, especially in patients with BMI greater than or equal to 35 kg/m2 with multiple complications.   " "  Plan:     (Z13.6) CARDIOVASCULAR SCREENING; LDL GOAL LESS THAN 130  Comment: Discussed cardiac disease risk factors and cardiac disease risk factor modification.   Plan: Lipid panel reflex to direct LDL Fasting, CBC         with platelets, Basic metabolic panel, AST, ALT            (M54.5) Bilateral low back pain without sciatica, unspecified chronicity  Comment: Discussed typical mechanical back pain, typical causes, and atypical back pain, including red flag symptoms.  Discussed conservative tratments inclduing physical therpy, stretching and strengthening, use of heat and/or ice, NSAIDs with food, antispasmodics where indicated, and the use of narcotic pain meds where indicated.    Plan:        COUNSELING:   Reviewed preventive health counseling, as reflected in patient instructions       Regular exercise       Healthy diet/nutrition       Vision screening       Hearing screening       Colon cancer screening       Prostate cancer screening    Estimated body mass index is 36.05 kg/m  as calculated from the following:    Height as of this encounter: 1.753 m (5' 9\").    Weight as of this encounter: 110.7 kg (244 lb 1.6 oz).     **I called the patient on the phone to discuss lab results.  I recommend starting either rosuvastatin 20 mg or a atorvastatin 20 mg once per day.  I sent a prescription for rosuvastatin with instructions for the pharmacy to contact us if very expensive.  I reviewed the side effects of the medicine including liver irritation, new or worsened muscle aching, etc.  Asked him to stop the medicine and contact us if he has any concerns for side effects.  Return for a \"lab only appointment\" in approximately 3 months.  Please get these labs done in a fasting state with nothing to eat for at least 8 hours prior to getting labs drawn.  I will make contact regarding the results and any recommendations once I have reviewed them.   If labs are improved without significant abnormality, continue the same " medication further refills will be sent.  If the labs do not show significant improvement in the lipids or any abnormalities, I will adjust the medication or follow-up accordingly.     reports that he has never smoked. He has never used smokeless tobacco.      Counseling Resources:  ATP IV Guidelines  Pooled Cohorts Equation Calculator  FRAX Risk Assessment  ICSI Preventive Guidelines  Dietary Guidelines for Americans, 2010  USDA's MyPlate  ASA Prophylaxis  Lung CA Screening    Urban Stallings MD  [facility]

## 2020-01-22 NOTE — LETTER
January 22, 2020      Tylor Meado  [address]   [place] [address]        To whom it may concern:    Tylor Mcgarry was seen in our office today for a medical appointment.    If you have any questions or concerns, please call the clinic at the number listed above.       Sincerely,        Urban Stallings MD

## 2020-01-22 NOTE — PATIENT INSTRUCTIONS
"*  rechecking cholesterol levels.     *  If the LDL is above 160 again despite better diet, then you should strongly consider taking a \"statin cholesterol medication to lower these levels and thereby prevent the chance of developing coronary artery disease over the next 25 years            LUMBAR STRAIN/SPASM:     *  based on your history, No evidence for herniated disc, spinal stenosis, or vertebral fracture.    *  take over the counter Motrin/Ibuprofen/Advil or Aleve to help relieve pain and inflammation.  follow the directions on the bottle.  Be sure to take with a small meal to prevent stomach upset.      --Motrin 600 mg ( 3 x 200 mg tablets) three times per day every day for 5-7 days, then 2-3 timers per day as needed (take with food to avoid stomach side effects)     OR     --Aleve 2 tablets twice per day for 5-7 days every day, then twice per day as needed     *  do not go out of your way for activity, however, do not avoid basic activates and gentle activity.  Do not lay on the sofa, do not go on bed rest.        *  moist heat as needed like a micorwavable bean bag.  Apply the heat for 10-15 minutes a few times per day as needed.  I would avoid any sort of electrical heating pad out of fear of causing skin burns.  If you do use an electric heating pad, do not use a heating pad for longer than 15 minutes to lower the risk of burns.    *  avoid any lifting for the next 1 week, then a slow return to activity.  Remember to always use proper lifting technique, always bending at the knees rather than the waist.  Your thigh muscles are MUCH stronger than the smaller muscle of your lower back.    *  Physical therapy through [facility] (third floor of the [facility] in [place] and many other locations throughout the [facility] and [facility]) if you do not get better.    *  expect your back to be more easily re-aggravated over the next few months.  the soft tissue and muscles are going " "to be more easily re-irritated over the next several weeks so be careful to return to physical action slowly.    Look for back execises on [employer].  (search \"low back pain rehabilitation exercises\"):  Here are some examples:    --[email or web address]     --[email or web address]               Preventive Health Recommendations  Male Ages 40 to 49    Yearly exam:             See your health care provider every year in order to  o   Review health changes.   o   Discuss preventive care.    o   Review your medicines if your doctor has prescribed any.    You should be tested each year for STDs (sexually transmitted diseases) if you re at risk.     Have a cholesterol test every 5 years.     Have a colonoscopy (test for colon cancer) if someone in your family has had colon cancer or polyps before age 50.     After age 45, have a diabetes test (fasting glucose). If you are at risk for diabetes, you should have this test every 3 years.      Talk with your health care provider about whether or not a prostate cancer screening test (PSA) is right for you.    Shots: Get a flu shot each year. Get a tetanus shot every 10 years.     Nutrition:    Eat at least 5 servings of fruits and vegetables daily.     Eat whole-grain bread, whole-wheat pasta and brown rice instead of white grains and rice.     Get adequate Calcium and Vitamin D.        --Good Grains:  Oats, brown rice, Quinoa (these do not raise the blood sugar as much)     --Bad grains:  Anything made from wheat or white rice     (because these raise the blood sugars significantly, and the possible gluten issue from wheat for some people).      --Proteins:  Aim for \"lean proteins\" including chicken, fish, seafood, pork, turkey, and eggs (in moderation); Eat red meat only occasionally          Sb Perea:                  Lifestyle    Exercise for at least 150 minutes a " week (30 minutes a day, 5 days a week). This will help you control your weight and prevent disease.     Limit alcohol to one drink per day.     No smoking.     Wear sunscreen to prevent skin cancer.     See your dentist every six months for an exam and cleaning.

## 2020-01-22 NOTE — LETTER
"1/22/2020      Tylor Hubbard Zeferino  [address]   [facility] [address]      Dear Mr. Tylor Mattujillo:    I am writing to inform you of the results of the laboratory tests you had done recently.    Total Cholesterol:   Lab Results   Component Value Date    CHOL 276 01/22/2020          (Recommended: below 200)  HDL (good) Cholesterol :   Lab Results   Component Value Date    HDL 44 01/22/2020         (Recommended: 40 or more)  LDL (bad) Cholesterol:    Lab Results   Component Value Date     01/22/2020          (Recommended: below 130, below 100 if heart disease or diabetes is diagnosed)  Triglycerides:    Lab Results   Component Value Date    TRIG 206 01/22/2020       (Recommended: below 180)  Non HDL cholesterol (Cholesterol ratio:   Lab Results   Component Value Date    NHDL 232 01/22/2020     (Ideally below 130, Acceptable below 160).    Additional results of your recent labs are as noted.  Liver function: NORMAL  Kidney function: NORMAL  Hemoglobin: NORMAL  Electrolytes: NORMAL  Glucose: NORMAL    Your labs all looked good, except the LDL (\"bad\") cholesterol was much higher than desired.  Diet and exercise are important and can lower the cholesterol level in some cases, however given your strong family history of higher cholesterol levels I doubt that any diet changes will lower this significantly.  Based on the significant elevation and your family history, I would recommend starting a medication to control this LDL cholesterol more effectively.  I specifically would start either rosuvastatin (generic Crestor) 20 mg or atorvastatin (generic Lipitor) 20 mg once per day.  I sent a prescription to [facility] on [address]  Taking this medication will definitely improve things thereby reducing the chance of a vascular event in the next 25 years.  The most common side effects of this medicine are new or worsened muscle aching or intestinal upset.  I will send a prescription to the " "pharmacy.  After 2 or 3 months, Return to our clinic for a \"lab only appointment\" to recheck the cholesterol levels and confirm no side effects..  Please get these labs done in a fasting state with nothing to eat for at least 8 hours prior to getting labs drawn.  I will make contact regarding the results and any recommendations once I have reviewed them.  If you experience any side effects, please stop the medication and contact us.    Thank you for allowing me to participate in your care. If you have any further questions or problems, please contact me via our nurse line at [phone]    Sincerely,          Urban Stallings M.D.  Department of Internal Medicine  [facility]   "

## 2020-01-22 NOTE — NURSING NOTE
"Chief Complaint   Patient presents with     Physical     /72   Pulse 69   Temp 98.5  F (36.9  C) (Temporal)   Ht 1.753 m (5' 9\")   Wt 110.7 kg (244 lb 1.6 oz)   SpO2 98%   BMI 36.05 kg/m   Estimated body mass index is 36.05 kg/m  as calculated from the following:    Height as of this encounter: 1.753 m (5' 9\").    Weight as of this encounter: 110.7 kg (244 lb 1.6 oz).        Health Maintenance that is potentially due pending provider review:  NONE    n/a    PEGGY Haywood  "

## 2020-10-27 ENCOUNTER — OFFICE VISIT (OUTPATIENT)
Dept: INTERNAL MEDICINE | Facility: CLINIC | Age: 45
End: 2020-10-27
Payer: COMMERCIAL

## 2020-10-27 VITALS
HEIGHT: 69 IN | DIASTOLIC BLOOD PRESSURE: 80 MMHG | HEART RATE: 81 BPM | TEMPERATURE: 98.9 F | SYSTOLIC BLOOD PRESSURE: 110 MMHG | BODY MASS INDEX: 37.52 KG/M2 | WEIGHT: 253.3 LBS | OXYGEN SATURATION: 98 %

## 2020-10-27 DIAGNOSIS — F43.0 ACUTE REACTION TO STRESS: ICD-10-CM

## 2020-10-27 DIAGNOSIS — N52.9 ERECTILE DYSFUNCTION, UNSPECIFIED ERECTILE DYSFUNCTION TYPE: ICD-10-CM

## 2020-10-27 DIAGNOSIS — E78.5 HYPERLIPIDEMIA LDL GOAL <130: ICD-10-CM

## 2020-10-27 DIAGNOSIS — F51.01 PRIMARY INSOMNIA: Primary | ICD-10-CM

## 2020-10-27 PROCEDURE — 99213 OFFICE O/P EST LOW 20 MIN: CPT | Performed by: INTERNAL MEDICINE

## 2020-10-27 RX ORDER — SILDENAFIL 100 MG/1
50-100 TABLET, FILM COATED ORAL DAILY PRN
Qty: 30 TABLET | Refills: 1 | Status: SHIPPED | OUTPATIENT
Start: 2020-10-27 | End: 2021-09-22

## 2020-10-27 ASSESSMENT — MIFFLIN-ST. JEOR: SCORE: 2024.34

## 2020-10-27 NOTE — PATIENT INSTRUCTIONS
"  *  Stress management.     *  Trial of Silednafil (generic Viagara) as needed before sex.      *  Return to see me in 3 months.  Use [facility] or Call [phone] to schedule the appointment with me.       INSOMNIA:        Avoid caffeine within 6 hours of bed, avoid alcohol at night, avoid late meals and late exercise, avoid late physical activity.     Keep your sleep environment cool, dark and quiet with comfortable bedding, pillow, and mattress.      Try gentle background sounds with a sound machine, or small fan.  Try to keep a regular sleep-wake schedule.      For stimulus control, avoid being in bed for more than 20 minutes if unable to sleep.  If you do get out of bed, keep the lights dim, read a book that is not particularly entertaining, and return to sleep if you start feeling drowsy.     Practice deep breathing or other relaxation exercises.  Consider using an carey to help with relaxation like \"Breethe: Sleep and Meditation\", \"Calm\" or \"Ahsan: Meditation & Sleep\"    Do not use cell  phone, ipad, or TV or any device that emits \"blue light\". This causes stimulation of the brain.     Do not check email or perform any work related tasks within 30-60 minutes of bedtime, you need to avoid stimulation of the mind.      Trial of herbal sleep aids, these are safe, Follow the directions on the bottle.    --Melatonin, start 3 or 5 mg, increase upwards.    ---750 mg    Over the counter sleep aids Unison, Doxylamine, or diphenhydramine.  (These can possibly cause urinary troubles)    Most times, recurrent insomnia without an obvious cause is considered more of a symptom than a separate disease and many times can be a symptom of mood disorders such as depression and/or anxiety.      Read the following books for tips on manaing insomnia:  o  \"Say Good Night to Insomnia\" (by Shailesh Diaz)  o \"The Sleep Solution:  Why Your Sleep is Broken, and How to Fix It\" (by Abbe Chávez)    If none of these measures help relieve " insomnia, then return to see me.

## 2020-10-27 NOTE — PROGRESS NOTES
"Subjective     Tylor Stevie Mcgarry is a 45 year old male who presents to clinic today for the following health issues:    HPI          Insomnia  Onset/Duration: x2 months  Description:   Frequency of insomnia:  1-2 times a week  Time to fall asleep (sleep latency): 45 minutes  Middle of night awakening:  YES- sometimes   Early morning awakening:  YES- 4 days a week   Progression of Symptoms:  same and intermittent  Accompanying Signs & Symptoms:  Daytime sleepiness/napping: YES- daytime sleepiness, unable to nap  Excessive snoring/apnea: no  Restless legs: no  Waking to urinate: YES- nightly   Chronic pain:  no  Depression symptoms (if yes, do PHQ9): no  Anxiety symptoms (if yes, do RICA-7): no  History:  Prior Insomnia: no  New stressful situation: YES- father is ill and is in [place], mother stuck in [place]  Precipitating factors:   Caffeine intake: YES- 2 cups of coffee a day   OTC decongestants: no  Any new medications: no  Alleviating factors:  Self medicating (alcohol, etc.):  herbal tea   Stress-reduction (exercise, yoga, meditation etc): YES- reading and relaxing more   Therapies tried and outcome: none    Reports a great deal of stress in his life due to circumstances surrounding COVID-19 infection.  His father is currently in [facility] with issues regarding the \"nerves of his legs \".  His mother is stuck in [place], unable to travel to the night states due to recent international travel restrictions, however he is hopeful that the situation will change.    Use goes to sleep approximately 10 PM along with his wife.    On Monday through Thursdays, his wife awakens at 3 AM to get ready for her job.  He usually wakes up with her at 3 AM to help her get ready  And then tries to go back to sleep and sleep until he awakens at 630 for his work.  Over the past 2 to 3 months he has had difficulty falling back asleep when he awakens at 3 AM.  He finds himself stressed and thinking about the things and his life that " "are stressful.    Denies radha depression or anxiety symptoms.    2.  Pt. reports difficulty achieving and maintaining erections over the past 2 or 3 months.  Denies other specific  complaints.  Has not tried VIAGRA or similar medication.  Is interested in trying VIAGRA.         I reviewed the information recorded in the patient's EPIC chart (including but not limited to medical history, surgical history, problem list, medication list, and allergy list) and updated information as needed.         Review of Systems   Constitutional, HEENT, cardiovascular, pulmonary, gi and gu systems are negative, except as otherwise noted.      Objective    /80   Pulse 81   Temp 98.9  F (37.2  C) (Temporal)   Ht 1.753 m (5' 9\")   Wt 114.9 kg (253 lb 4.8 oz)   SpO2 98%   BMI 37.41 kg/m    Body mass index is 37.41 kg/m .  Physical Exam     Physical exam deferred today.                (F51.01) Primary insomnia  (primary encounter diagnosis)  Comment: Undoubtedly situational due to the acute stress currently.  Expect the symptoms to resolve.  He does not have the problems on the weekends when is able to sleep longer.  If he feels that waking up at 3 AM and not being able to fall back asleep is a problem, he should try to avoid waking up with his wife if that is an option, as this is his choice to wake up with her and help her get ready for work.  I would worry about getting any sort of sleeping or sedating medicine at 3:00 in the morning as this may produce profound fatigue early in the day.    Discussed issues of sleep hygiene    Plan:     (E78.5) Hyperlipidemia LDL goal <130  Comment: LDL fairly elevated earlier this year.  Based on his elevated LDL above 190, I recommend he consider a statin medicine.  He never filled the prescription and has not been taking it.  He prefers to try and work on his diet.  Recommend lower carbohydrate, lower fat diet.    Recheck cholesterol early next year, reconsider medications if " indicated at that time.  Plan:     (N52.9) Erectile dysfunction, unspecified erectile dysfunction type  Comment: Undoubtedly his symptoms are primarily psychosomatic due to the great increase stress.  I expect the erection difficulties to resolve as the stress improves.  In the meantime okay to try Viagra.  Reviewed side effects and symptoms and mechanism of action.  Plan: sildenafil (VIAGRA) 100 MG tablet            (F43.0) Acute reaction to stress  Comment: As above, great deal of stress in his life or numerous factors.  Hopefully this will improve with time and resolution of his family situation.  Recommended he continue to employ relaxation measures such as reading, meditation, exercise.  Recommend he is consider counseling/therapy if his symptoms continue to remain pervasive despite self treatment.  Did not feel a medication is indicated at this time  Plan:        I spent greater than 15 minutes with pt , greater than 50% of time was educational and counseling.

## 2021-06-29 ENCOUNTER — OFFICE VISIT (OUTPATIENT)
Dept: INTERNAL MEDICINE | Facility: CLINIC | Age: 46
End: 2021-06-29
Payer: COMMERCIAL

## 2021-06-29 VITALS
SYSTOLIC BLOOD PRESSURE: 114 MMHG | DIASTOLIC BLOOD PRESSURE: 78 MMHG | WEIGHT: 247.8 LBS | TEMPERATURE: 97.9 F | BODY MASS INDEX: 36.7 KG/M2 | HEIGHT: 69 IN | OXYGEN SATURATION: 97 % | HEART RATE: 78 BPM

## 2021-06-29 DIAGNOSIS — E66.01 SEVERE OBESITY (BMI 35.0-35.9 WITH COMORBIDITY) (H): ICD-10-CM

## 2021-06-29 DIAGNOSIS — R53.83 FATIGUE, UNSPECIFIED TYPE: ICD-10-CM

## 2021-06-29 DIAGNOSIS — M54.50 CHRONIC BILATERAL LOW BACK PAIN WITHOUT SCIATICA: Primary | ICD-10-CM

## 2021-06-29 DIAGNOSIS — G89.29 CHRONIC BILATERAL LOW BACK PAIN WITHOUT SCIATICA: Primary | ICD-10-CM

## 2021-06-29 DIAGNOSIS — E78.5 HYPERLIPIDEMIA LDL GOAL <130: ICD-10-CM

## 2021-06-29 DIAGNOSIS — Z11.52 ENCOUNTER FOR SCREENING FOR COVID-19: ICD-10-CM

## 2021-06-29 DIAGNOSIS — Z91.89 AT INCREASED RISK OF EXPOSURE TO COVID-19 VIRUS: ICD-10-CM

## 2021-06-29 DIAGNOSIS — R06.83 SNORING: ICD-10-CM

## 2021-06-29 LAB
ERYTHROCYTE [DISTWIDTH] IN BLOOD BY AUTOMATED COUNT: 13.7 % (ref 10–15)
HCT VFR BLD AUTO: 45.9 % (ref 40–53)
HGB BLD-MCNC: 15.4 G/DL (ref 13.3–17.7)
MCH RBC QN AUTO: 28.6 PG (ref 26.5–33)
MCHC RBC AUTO-ENTMCNC: 33.6 G/DL (ref 31.5–36.5)
MCV RBC AUTO: 85 FL (ref 78–100)
PLATELET # BLD AUTO: 316 10E9/L (ref 150–450)
RBC # BLD AUTO: 5.38 10E12/L (ref 4.4–5.9)
WBC # BLD AUTO: 6.9 10E9/L (ref 4–11)

## 2021-06-29 PROCEDURE — 84450 TRANSFERASE (AST) (SGOT): CPT | Performed by: INTERNAL MEDICINE

## 2021-06-29 PROCEDURE — 85027 COMPLETE CBC AUTOMATED: CPT | Performed by: INTERNAL MEDICINE

## 2021-06-29 PROCEDURE — 84443 ASSAY THYROID STIM HORMONE: CPT | Performed by: INTERNAL MEDICINE

## 2021-06-29 PROCEDURE — 84460 ALANINE AMINO (ALT) (SGPT): CPT | Performed by: INTERNAL MEDICINE

## 2021-06-29 PROCEDURE — 36415 COLL VENOUS BLD VENIPUNCTURE: CPT | Performed by: INTERNAL MEDICINE

## 2021-06-29 PROCEDURE — 99214 OFFICE O/P EST MOD 30 MIN: CPT | Performed by: INTERNAL MEDICINE

## 2021-06-29 PROCEDURE — 86769 SARS-COV-2 COVID-19 ANTIBODY: CPT | Performed by: INTERNAL MEDICINE

## 2021-06-29 PROCEDURE — 80061 LIPID PANEL: CPT | Performed by: INTERNAL MEDICINE

## 2021-06-29 PROCEDURE — 80048 BASIC METABOLIC PNL TOTAL CA: CPT | Performed by: INTERNAL MEDICINE

## 2021-06-29 ASSESSMENT — MIFFLIN-ST. JEOR: SCORE: 1999.39

## 2021-06-29 NOTE — LETTER
"June 30, 2021      Tylor Yullo  [address]   [facility] [address]        Dear ,    We are writing to inform you of your test results.    Your labs all look good including normal kidney function, liver function, electrolytes, glucose, blood counts, and thyroid.  There are no reasons for your fatigue found these normal blood test.    The Covid antibody test were negative, indicating that you either have not been exposed to the Covid virus before or if you did, you did not develop adequate antibodies.  Regardless of this result, you still should get the COVID-19 vaccine, preferentially the Pfizer or Moderna product.    The LDL cholesterol was elevated as expected, similar to previous levels.  Given your strong family history of heart disease, I strongly recommend starting a medication to lower the LDL cholesterol level and more importantly reduce the inflammation inside your arteries over the next many years which will reduce the chance of future vascular events.      Specifically start rosuvastatin 20 mg once daily.  I will send a prescription to your pharmacy.  The main side effects are new or worsened muscle aching or intestinal upset.  If you experience any side effects stop the medicine and contact me for further instructions.    Return for a \"lab only appointment\" in approximately 3 months.  Please get these labs done in a fasting state with nothing to eat for at least 8 hours prior to getting labs drawn.  I will make contact regarding the results and any recommendations once I have reviewed them.       Resulted Orders   COVID-19 Jadiel RBD Ju[place] & Titer Reflex   Result Value Ref Range    COVID-19 Jadiel RBD Ju[place] Negative       Comment:      No COVID-19 antibodies detected. Patients with recent COVID-19 vaccination or   recent symptom onset from COVID-19 infection may not produce sufficient levels   of detectable antibodies. Immunocompromised COVID-19 patients may take longer   to " develop antibodies.      COVID-19 Jadiel RBD [place] Titer Not Applicable       Comment:      Qualitative screen for IgG, IgM and IgA antibodies to COVID-19 (SARS-CoV-2)   spike receptor binding domain (RBD) protein. COVID-19 spike RBD antibodies may   be elevated due to vaccination, or a past or current COVID-19 infection.  The qualitative screen for IgG, IgM and IgA COVID-19 spike RBD antibodies is   performed on the [employer] [facility], and this test is approved by the FDA under the   Emergency Use Authorization (EUA).  Negative results do not rule out COVID-19 infection. Results from antibody   testing should not be used as the sole basis to diagnose or exclude SARS-CoV-2   infection or to inform infection status. COVID-19 PCR test should be ordered   if current infection is suspected. False positive results may occur in rare   cases due to cross-reacting antibodies.  Testing performed by [facility], [facility], [address] [place] [address], [address], [place] [address]     CBC with platelets   Result Value Ref Range    WBC 6.9 4.0 - 11.0 10e9/L    RBC Count 5.38 4.4 - 5.9 10e12/L    Hemoglobin 15.4 13.3 - 17.7 g/dL    Hematocrit 45.9 40.0 - 53.0 %    MCV 85 78 - 100 fl    MCH 28.6 26.5 - 33.0 pg    MCHC 33.6 31.5 - 36.5 g/dL    RDW 13.7 10.0 - 15.0 %    Platelet Count 316 150 - 450 10e9/L   TSH with free T4 reflex   Result Value Ref Range    TSH 1.17 0.40 - 4.00 mU/L   Lipid panel reflex to direct LDL Fasting   Result Value Ref Range    Cholesterol 263 (H) <200 mg/dL      Comment:      Desirable:       <200 mg/dl    Triglycerides 165 (H) <150 mg/dL      Comment:      Borderline high:  150-199 mg/dl  High:             200-499 mg/dl  Very high:       >499 mg/dl      HDL Cholesterol 48 >39 mg/dL    LDL Cholesterol Calculated 182 (H) <100 mg/dL      Comment:      Above desirable:  100-129 mg/dl  Borderline High:  130-159 mg/dL  High:             160-189 mg/dL  Very high:       >189  mg/dl      Non HDL Cholesterol 215 (H) <130 mg/dL      Comment:      Above Desirable:  130-159 mg/dl  Borderline high:  160-189 mg/dl  High:             190-219 mg/dl  Very high:       >219 mg/dl     ALT   Result Value Ref Range    ALT 46 0 - 70 U/L   AST   Result Value Ref Range    AST 20 0 - 45 U/L   Basic metabolic panel   Result Value Ref Range    Sodium 137 133 - 144 mmol/L    Potassium 4.0 3.4 - 5.3 mmol/L    Chloride 105 94 - 109 mmol/L    Carbon Dioxide 24 20 - 32 mmol/L    Anion Gap 8 3 - 14 mmol/L    Glucose 101 (H) 70 - 99 mg/dL    Urea Nitrogen 13 7 - 30 mg/dL    Creatinine 0.81 0.66 - 1.25 mg/dL    GFR Estimate >90 >60 mL/min/[1.73_m2]      Comment:      Non  GFR Calc  Starting 12/18/2018, serum creatinine based estimated GFR (eGFR) will be   calculated using the Chronic Kidney Disease Epidemiology Collaboration   (CKD-EPI) equation.      GFR Estimate If Black >90 >60 mL/min/[1.73_m2]      Comment:       GFR Calc  Starting 12/18/2018, serum creatinine based estimated GFR (eGFR) will be   calculated using the Chronic Kidney Disease Epidemiology Collaboration   (CKD-EPI) equation.      Calcium 9.2 8.5 - 10.1 mg/dL       If you have any questions or concerns, please call the clinic at the number listed above.       Sincerely,     Urban Stallings M.D.  Dept. of Internal Medicine  [facility]

## 2021-06-29 NOTE — PROGRESS NOTES
Assessment & Plan     (M54.5,  G89.29) Chronic bilateral low back pain without sciatica  (primary encounter diagnosis)  Comment: Chronic back pain, no red flag symptoms.  Chiropractic visits and adjustments not been terribly helpful in providing long-lasting relief.  Reviewed the nature of back pain mechanical aspects and the different muscle groups involved.  Strongly recommended he visit with our physical therapist to embark on dedicated low back program.  If symptoms worsen or fail to improve return for further evaluation.  Plan: [facility] RE[facility]ERR[facility]            (E78.5) Hyperlipidemia LDL goal <130  Comment: Discussed current lipid results, previous results (if available) current guidelines (NCEP) for treatment and goals for lipids.  Discussed lifestyle modification, dietary changes (low fat, low simple carb) and regular aerobic exercise.  Discussed the link between dysmetabolic syndrome and impaired glucose tolerance seen in certain patterns of lipids.  Briefly discussed medication used for lipid lowering, including the statins are their possible side effects of myalgias, rhabdomyolysis, and liver toxicity.   He has been strongly resistant to taking any sort of medicine however preconceived bias against any sort of medications, yet he is interested in maximal risk reduction because of his father's cardiac history.  If his LDL is above 175 I strongly recommend he initiate a statin cholesterol-lowering medicine to not only lower the HDL cholesterol but for maximal risk factor modification given his strong family history of vascular disease prematurely.  Specifically start rosuvastatin 20 mg once daily, reviewed side effects including new or worsened muscle pains and myalgias, and elevated LFTs.   If medication started, return for fasting labs in 2 to 3 months to recheck lipids and liver function.  Stay at this point he is willing to consider medication.  Plan:     (E66.01,  Z68.35) Severe obesity (BMI  35.0-35.9 with comorbidity) (H)  Comment: Current BMI is: Body mass index is 36.59 kg/m ..  Reviewed weight patterns.   Obesity as a biological preventable and treatable disease, which is associated with significantly increased risk of many acute and chronic health conditions.   Obesity has now been recognized as a chronic disease by the American Medical Association.  Obesity has serious co-morbidities associated with obesity including increased risk for hypertension, stroke, coronary artery disease, dyslipidemia, Type II diabetes, depression, sleep apnea, cancers of the colon, breast and endometrium, obstructive sleep apnea, osteoarthritis and female infertility.  Recommended regular aerobic exercise, recommended improved diet aiming at lowering amount of processed foods, lower sugars, moderation/reduction of simple carbs and fat in the diet, establishing more regular meal times, always eating breakfast, front loading some of the calories and adding more protein to the diet.        Plan:     (R53.83) Fatigue, unspecified type  Comment: No obvious medical cause for the fatigue.    Will check for routine medical cause with the labs as ordered, suspect they will return abnormal.  Will follow up any abnormal labs as indicated.  D  Nonspecific fatigue may have other causes including stress and problems at home or work, or may be associated with underlying mood disorders in general.    Other causes may include sleep apnea, chronic fatigue syndrome, post viral syndromes, fibromyalgia, connective tissue disease etc.     Patient very well may have sleep apnea based on symptoms.  I strongly recommended referral to sleep clinic for further evaluation including a sleep study.  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  He does not recall any specific symptoms in the past 15 months that would be consistent with Covid that he did not have in his family.  He has been hesitant to get the vaccine.  He is requesting laboratory testing for COVID-19 antibodies to determine if he has ever had a and just never knew it.  Told the patient that he should receive the COVID-19 vaccine regardless of Covid antibodies present or not given the better protection afforded by the COVID-19 vaccine against the different variants, and the unknown nature of protection from Covid antibiotics.  I told him specifically to get the 2 shot Martinez or Pfizer vaccines.  Plan:     (Z91.89) At increased risk of exposure to COVID-19 virus  Comment:   Plan: COVID-19 Jadiel [facility] Reflex            (R06.83) Snoring  Comment: Suspect the patient has enough symptoms to warrant sleep study investigation to rule out sleep apnea.  Plan: SLEEP EVALUATION & MANAGEMENT REFERRAL - [facility]         [phone]  (Age 18 and up)                        BMI:   Estimated body mass index is 36.59 kg/m  as calculated from the following:    Height as of this encounter: 1.753 m (5' 9\").    Weight as of this encounter: 112.4 kg (247 lb 12.8 oz).           Return in about 6 months (around 12/29/2021) for Annual physical.    **If statin cholesterol-lowering medicine started,Return for a \"lab only appointment\" in approximately 3 months.  Please get these labs done in a fasting state with nothing to eat for at least 8 hours prior to getting labs drawn.  I will make contact regarding the results and any recommendations once I have reviewed them.     Urban Stallings MD  [facility]    Earlene Snider is a 45 year old who presents for the " following health issues     HPI     Back Pain  Onset/Duration: x2 weeks  Description:   Location of pain: low back both  Character of pain: dull ache, gnawing and cramping  Pain radiation: radiates into the right buttocks and radiates into the left buttocks  New numbness or weakness in legs, not attributed to pain: no   Intensity: moderate to severe   Progression of Symptoms: same  History:   Specific cause: none  Pain interferes with job: YES  History of back problems: self limited episode of low back pain in the past  Any previous MRI or X-rays: None  Sees a specialist for back pain: No  Alleviating factors:   Improved by: ibuprofen and stretch     Precipitating factors:  Worsened by: Lifting, Standing and Sitting for a long period of time  Therapies tried and outcome: ibuprofen - helps with sx temporarily     2.  Hyperlipidemia:  Has history of hyperlipidemia.    Has been recommended numerous times take statin cholesterol-lowering medicines for maximal cardiac disease risk factor reduction due to the pure hyperlipidemia and his history of premature coronary artery disease in his father at age 55.    Latest labs reviewed:    Recent Labs   Lab Test 01/22/20  1134 04/15/17  0951   CHOL 276* 248*   HDL 44 55   * 172*   TRIG 206* 106        Lab Results   Component Value Date    AST 17 01/22/2020       3.     The patient has had a history of ongoing obesity.  Reviewed the weigth curves.   Their current BMI is:  Body mass index is 36.59 kg/m .  Reviewed previous attempts at weight loss which have not been successful in producing prolonged weigth loss.   Discussed current eating and exercise habits.     Reviewed weights in chart:  Wt Readings from Last 10 Encounters:   06/29/21 112.4 kg (247 lb 12.8 oz)   10/27/20 114.9 kg (253 lb 4.8 oz)   01/22/20 110.7 kg (244 lb 1.6 oz)   03/23/19 108 kg (238 lb)   02/01/18 104.6 kg (230 lb 9.6 oz)   05/12/17 101 kg (222 lb 9.6 oz)   04/14/17 100.4 kg (221 lb 4.8 oz)  "  10/20/16 102.9 kg (226 lb 14.4 oz)   04/15/16 100.8 kg (222 lb 3.2 oz)   03/17/16 100.7 kg (221 lb 14.4 oz)      4.  Reports nonspecific fatigue of a generalized nature for the last few months.  Denies intestinal symptoms (i.e. No diarrhea, no constipation, no irregular BMs), Denies chest pain, shortness of breath, or dyspnea on exertion.  Denies fevers, unintended weight loss, unexplained abdominal pain, unexplained joint or muscle pain,  recent travels, or  history of autoimmune disease.   The patient does not feel that they are aware of any specific cause for this fatigue.      Reports snoring on a regular basis.  No witnessed apnea.  Typically does not awaken refreshed after good night sleep; does find himself napping on the couch frequently    **I reviewed the information recorded in the patient's EPIC chart (including but not limited to medical history, surgical history, family history, problem list, medication list, and allergy list) and updated the information as indicated based on the patients reported information.         Review of Systems   Constitutional, HEENT, cardiovascular, pulmonary, gi and gu systems are negative, except as otherwise noted.      Objective    /78   Pulse 78   Temp 97.9  F (36.6  C) (Temporal)   Ht 1.753 m (5' 9\")   Wt 112.4 kg (247 lb 12.8 oz)   SpO2 97%   BMI 36.59 kg/m    Body mass index is 36.59 kg/m .  Physical Exam   GENERAL alert and no distress  EYES:  Normal sclera,conjunctiva, EOMI  HENT: oral and posterior pharynx without lesions or erythema, facies symmetric  NECK: Neck supple. No LAD, without thyroidmegaly.  RESP: Clear to ausculation bilaterally without wheezes or crackles. Normal BS in all fields.  CV: RRR normal S1S2 without murmurs, rubs or gallops.  LYMPH: no cervical lymph adenopathy appreciated  MS: extremities- no gross deformities of the visible extremities noted,   EXT:  no lower extremity edema  PSYCH: Alert and oriented times 3; speech- " coherent  SKIN:  No obvious significant skin lesions on visible portions of face

## 2021-06-29 NOTE — PATIENT INSTRUCTIONS
"*  Recheck the cholesterol    *  If the LDL is again above 175, I strongly recommenda starting a cholesterol lowering medication:     --Rosuvastatin 20 mg once per day      --Main side effects are new or worsened muscle aching.  Stop the medication if any new or concerning side effects occur.     *  If a cholesterol medication started, Return for a \"lab only appointment\" in approximately 3 months.  Please get these labs done in a fasting state with nothing to eat for at least 8 hours prior to getting labs drawn.  I will make contact regarding the results and any recommendations once I have reviewed them.     *  Referral to physcial therapy for the chronic back pain a regular back program of strengthening and stretching will be most beneficial in preventing future episodes of back pain.     *  I strongly recommend the Covid vaccine (either Pfizer or Moderna).  This is the best way to prevent the Covid infection.     *  I suspect that you have obstructive sleep apnea based on your snoring and chonric fatigue and not awakening refreshed from sleep.      --[facility] [phone]            LUMBAR STRAIN/SPASM:     *  based on your history, No evidence for herniated disc, spinal stenosis, or vertebral fracture.    *  take over the counter Motrin/Ibuprofen/Advil or Aleve to help relieve pain and inflammation.  follow the directions on the bottle.  Be sure to take with a small meal to prevent stomach upset.      --Motrin 600 mg ( 3 x 200 mg tablets) three times per day every day for 5-7 days, then 2-3 timers per day as needed (take with food to avoid stomach side effects)     OR     --Aleve 2 tablets twice per day for 5-7 days every day, then twice per day as needed     *  do not go out of your way for activity, however, do not avoid basic activates and gentle activity.  Do not lay on the sofa, do not go on bed rest.        *  moist heat as needed like a micorwavable bean bag.  Apply the heat for " "10-15 minutes a few times per day as needed.  I would avoid any sort of electrical heating pad out of fear of causing skin burns.  If you do use an electric heating pad, do not use a heating pad for longer than 15 minutes to lower the risk of burns.    *  avoid any lifting for the next 1 week, then a slow return to activity.  Remember to always use proper lifting technique, always bending at the knees rather than the waist.  Your thigh muscles are MUCH stronger than the smaller muscle of your lower back.    *  Physical therapy through [facility] (third floor of the [facility] in [place] and many other locations throughout the [facility] and [facility]) if you do not get better.    *  expect your back to be more easily re-aggravated over the next few months.  the soft tissue and muscles are going to be more easily re-irritated over the next several weeks so be careful to return to physical action slowly.    Look for back execises on [employer].  (search \"low back pain rehabilitation exercises\"):  Here are some examples:                     "

## 2021-06-30 LAB
ALT SERPL W P-5'-P-CCNC: 46 U/L (ref 0–70)
ANION GAP SERPL CALCULATED.3IONS-SCNC: 8 MMOL/L (ref 3–14)
AST SERPL W P-5'-P-CCNC: 20 U/L (ref 0–45)
BUN SERPL-MCNC: 13 MG/DL (ref 7–30)
CALCIUM SERPL-MCNC: 9.2 MG/DL (ref 8.5–10.1)
CHLORIDE SERPL-SCNC: 105 MMOL/L (ref 94–109)
CHOLEST SERPL-MCNC: 263 MG/DL
CO2 SERPL-SCNC: 24 MMOL/L (ref 20–32)
CREAT SERPL-MCNC: 0.81 MG/DL (ref 0.66–1.25)
GFR SERPL CREATININE-BSD FRML MDRD: >90 ML/MIN/{1.73_M2}
GLUCOSE SERPL-MCNC: 101 MG/DL (ref 70–99)
HDLC SERPL-MCNC: 48 MG/DL
LDLC SERPL CALC-MCNC: 182 MG/DL
NONHDLC SERPL-MCNC: 215 MG/DL
POTASSIUM SERPL-SCNC: 4 MMOL/L (ref 3.4–5.3)
SARS-COV-2 AB PNL SERPL IA: NEGATIVE
SARS-COV-2 IGG SERPL IA-ACNC: NORMAL
SODIUM SERPL-SCNC: 137 MMOL/L (ref 133–144)
TRIGL SERPL-MCNC: 165 MG/DL
TSH SERPL DL<=0.005 MIU/L-ACNC: 1.17 MU/L (ref 0.4–4)

## 2021-06-30 RX ORDER — ROSUVASTATIN CALCIUM 20 MG/1
20 TABLET, COATED ORAL DAILY
Qty: 90 TABLET | Refills: 0 | Status: SHIPPED | OUTPATIENT
Start: 2021-06-30 | End: 2021-09-22

## 2021-12-14 ENCOUNTER — IMMUNIZATION (OUTPATIENT)
Dept: NURSING | Facility: CLINIC | Age: 46
End: 2021-12-14
Payer: COMMERCIAL

## 2021-12-14 PROCEDURE — 0001A PR COVID VAC PFIZER DIL RECON 30 MCG/0.3 ML IM: CPT

## 2021-12-14 PROCEDURE — 91300 PR COVID VAC PFIZER DIL RECON 30 MCG/0.3 ML IM: CPT

## 2022-01-04 ENCOUNTER — IMMUNIZATION (OUTPATIENT)
Dept: NURSING | Facility: CLINIC | Age: 47
End: 2022-01-04
Attending: INTERNAL MEDICINE
Payer: COMMERCIAL

## 2022-01-04 PROCEDURE — 0002A PR COVID VAC PFIZER DIL RECON 30 MCG/0.3 ML IM: CPT

## 2022-01-04 PROCEDURE — 91300 PR COVID VAC PFIZER DIL RECON 30 MCG/0.3 ML IM: CPT

## 2022-01-23 ENCOUNTER — HEALTH MAINTENANCE LETTER (OUTPATIENT)
Age: 47
End: 2022-01-23

## 2022-02-16 ENCOUNTER — OFFICE VISIT (OUTPATIENT)
Dept: INTERNAL MEDICINE | Facility: CLINIC | Age: 47
End: 2022-02-16
Payer: COMMERCIAL

## 2022-02-16 VITALS
SYSTOLIC BLOOD PRESSURE: 110 MMHG | OXYGEN SATURATION: 96 % | DIASTOLIC BLOOD PRESSURE: 80 MMHG | BODY MASS INDEX: 38.62 KG/M2 | TEMPERATURE: 97.7 F | RESPIRATION RATE: 18 BRPM | HEART RATE: 82 BPM | WEIGHT: 261.5 LBS

## 2022-02-16 DIAGNOSIS — E66.01 SEVERE OBESITY (BMI 35.0-35.9 WITH COMORBIDITY) (H): ICD-10-CM

## 2022-02-16 DIAGNOSIS — E78.5 HYPERLIPIDEMIA LDL GOAL <130: ICD-10-CM

## 2022-02-16 DIAGNOSIS — Z91.013 SHELLFISH ALLERGY: Primary | ICD-10-CM

## 2022-02-16 DIAGNOSIS — E73.9 LACTOSE INTOLERANCE: ICD-10-CM

## 2022-02-16 DIAGNOSIS — Z13.6 CARDIOVASCULAR SCREENING; LDL GOAL LESS THAN 130: ICD-10-CM

## 2022-02-16 LAB
ALT SERPL W P-5'-P-CCNC: 52 U/L (ref 0–70)
AST SERPL W P-5'-P-CCNC: 20 U/L (ref 0–45)
CHOLEST SERPL-MCNC: 290 MG/DL
FASTING STATUS PATIENT QL REPORTED: YES
HDLC SERPL-MCNC: 45 MG/DL
LDLC SERPL CALC-MCNC: 206 MG/DL
NONHDLC SERPL-MCNC: 245 MG/DL
TRIGL SERPL-MCNC: 193 MG/DL

## 2022-02-16 PROCEDURE — 84460 ALANINE AMINO (ALT) (SGPT): CPT | Performed by: INTERNAL MEDICINE

## 2022-02-16 PROCEDURE — 84450 TRANSFERASE (AST) (SGOT): CPT | Performed by: INTERNAL MEDICINE

## 2022-02-16 PROCEDURE — 36415 COLL VENOUS BLD VENIPUNCTURE: CPT | Performed by: INTERNAL MEDICINE

## 2022-02-16 PROCEDURE — 80061 LIPID PANEL: CPT | Performed by: INTERNAL MEDICINE

## 2022-02-16 PROCEDURE — 99213 OFFICE O/P EST LOW 20 MIN: CPT | Performed by: INTERNAL MEDICINE

## 2022-02-16 NOTE — PROGRESS NOTES
"  Assessment & Plan     Shellfish allergy  New shellfish allergy, referral to allergy clinic.   - Adult Allergy/Asthma Referral; Future    Lactose intolerance  Remove all dairy.   - Adult Allergy/Asthma Referral; Future    Hyperlipidemia LDL goal <130  Recheck LDL levels, but expect them to be elevated again.   Start statin if LDL above 175.  Start rosvuastatin 20 mg once per day   - Lipid panel reflex to direct LDL Fasting  - AST  - ALT  - rosuvastatin (CRESTOR) 20 MG tablet; Take 1 tablet (20 mg) by mouth daily  - Lipid panel reflex to direct LDL Fasting; Future  - AST; Future  - ALT; Future    CARDIOVASCULAR SCREENING; LDL GOAL LESS THAN 130  Discussed cardiac disease risk factors and cardiac disease risk factor modification.   - Lipid panel reflex to direct LDL Fasting  - AST  - ALT    Severe obesity (BMI 35.0-35.9 with comorbidity) (H)                 BMI:   Estimated body mass index is 38.62 kg/m  as calculated from the following:    Height as of 9/22/21: 1.753 m (5' 9\").    Weight as of this encounter: 118.6 kg (261 lb 8 oz).           Return in about 6 months (around 8/16/2022) for Annual physical.    Urban Stallings MD  [facility] Snider is a 46 year old who presents for the following health issues     HPI     Concern - Lactose issue  Onset: every morning for a little bit   Description: If he drinks milk he feels gaggy  Intensity: mild, moderate  Progression of Symptoms:  Has stopped the milk but is worried about it still  Accompanying Signs & Symptoms:   Previous history of similar problem:   Precipitating factors:        Worsened by:   Alleviating factors:        Improved by:   Therapies tried and outcome:     Long history of hyperlipidemia.    Has been working on diet and lifestyle.     Has fmaily history of coronary artery disease     Has been previoisly recommended to take statin, but has not taken in I the past before.     Patient is also " concerned about his gums as now his roots are showing and wants to have blood work done to check this out.         **I reviewed the information recorded in the patient's EPIC chart (including but not limited to medical history, surgical history, family history, problem list, medication list, and allergy list) and updated the information as indicated based on the patients reported information.    '    Review of Systems   Constitutional, HEENT, cardiovascular, pulmonary, gi and gu systems are negative, except as otherwise noted.      Objective    /80   Pulse 82   Temp 97.7  F (36.5  C) (Tympanic)   Resp 18   Wt 118.6 kg (261 lb 8 oz)   SpO2 96%   BMI 38.62 kg/m    Body mass index is 38.62 kg/m .  Physical Exam   GENERAL alert and no distress  EYES:  Normal sclera,conjunctiva, EOMI  HENT: facies symmetric  MS: extremities- no gross deformities of the visible extremities noted,   PSYCH: Alert and oriented times 3; speech- coherent  SKIN:  No obvious significant skin lesions on visible portions of face   NEURO:  Normal facial movements.  Appears to move normally

## 2022-02-16 NOTE — PATIENT INSTRUCTIONS
"*  Referral to allergy specialist for further investigation.     Allergy   [address] [place] [address]   Phone: [phone]   Fax: [phone]         *  Lactose intolerance:       --Avoid dairy products as best able.      --Consider digestive aids such as Lact-Aid, Beano, the special milks that have acidophilus or lactose free, etc.      *  Rechecking the cholesterol levels today.    If the LDL above 190, then you really should take a medication.     However given your family history of coronary artery disease, you may very well want to to start taking the statin cholesterol lowering medicaiton to help reduce the risk of future coronary artery disease significantly.     The 10-year ASCVD risk score (Henri LAW Jr., et al., 2013) is: 2.9%    Values used to calculate the score:      Age: 46 years      Sex: Male      Is Non- : No      Diabetic: No      Tobacco smoker: No      Systolic Blood Pressure: 110 mmHg      Is BP treated: No      HDL Cholesterol: 48 mg/dL      Total Cholesterol: 263 mg/dL      *    5 GOALS TO PREVENT VASCULAR DISEASE:     1.  Aggressive blood pressure control, under 130/80 ideally.  Using medications if needed.    Your blood pressure is under good control    BP Readings from Last 4 Encounters:   02/16/22 110/80   06/29/21 114/78   10/27/20 110/80   01/22/20 108/72       2.  Aggressive LDL cholesterol (\"bad cholesterol\") lowering as indicated.    Your goal is an LDL under 130 for sure, preferably under 100.  (If you have diabetes or previous vascular disease, the the LDL goals would be under 100 for sure, preferably under 70.)    New guidelines identify four high-risk groups who could benefit from statins:   *people with pre-existing heart disease, such as those who have had a heart attack;   *people ages 40 to 75 who have diabetes of any type  *patients ages 40 to 75 with at least a 7.5% risk of developing cardiovascular disease over the " "next decade, according to a formula described in the guidelines  *patients with the sort of super-high cholesterol that sometimes runs in families, as evidenced by an LDL of 190 milligrams per deciliter or higher      Recent Labs   Lab Test 06/29/21  1341 01/22/20  1134   CHOL 263* 276*   HDL 48 44   * 191*   TRIG 165* 206*       3.  Aggressive diabetic prevention, screening and/or management.      You do not have diabetes as of the most recent blood tests.     4.  No smoking    5.  Consider daily preventative aspirin over age 50.          OBESITY/WEIGHT LOSS:     *  Obesity is a major problem in this country.  Over 2/3 of adult Americans are overweight (BMI Over 25), and 1/3 are obese (BMI over 30)    *  Obesity is the leading cause of diabetes type II, which currently affects 1 out of 10 adult Americans and is projected to potentially affect 1 out of 3 adult Americans by 2040    *  Chronic obesity leads to \"insulin resistance\" which affects the carbohydrate (sugar) metabolism causing \"diabetisy\" which leads to type II Diabetes, increased visceral fat, a chronic low grade inflammatory state that leads to higher rates of vascular disease among other things.     *  Obesity is defined as BMI (body mass index) greater than 30.    *  Morbid obesity is defined as BMI over 40    Your most recent Body mass index is:  Body mass index is 38.62 kg/m .    The main principles in weight loss are diet and exercise. You need to have both.      + Be physically active. Do activities that you enjoy, give you energy and are safe for you to do.Gradually build up the intensity (how hard your body is working) of activity. Long-term, aim for 10,000 steps OR 30 minutes or more of activity most days of the week.     +  It is very hard to lose weight with diet changes alone.  You need to have regular exercise.  You should aim for either 3 hours total per week total of cumulative physical aerobic activity or approximately 10,000 steps " "per day of activity.  Buy a pedometer or other fitness tracker and keep track of your activity.  If your typical daily activity does not add up to 10,000 total steps, then make up the difference with walking or some sort of aerobic activity.        + Eat \"real food\" (not processed), I.e. Never eat a single food item with more than 6 ingredients listed on the label.    +  Eat mostly vegetables, fruit, whole grains and lean proteins. That way, you--not food manufacturers--control the ingredients that go into your meals.    +  Keep your food shopping to the outside of the grocery store, do not eat foods that come from a bag or box, do not eat foods with bar codes.    + Aim for 5 servings of fruits and vegetables a day. Choose a variety of vegetables with different colors. Have fresh fruit for dessert. Limit  deep-fried vegetables, such as french fries.    + Choose lean protein, such as chicken or fish. Try non-meat sources of protein such as beans, soy and other legumes.    + Choose whole grains. Whole grain foods, such as whole-wheat bread, brown rice, barley, quinoa and oatmeal, contain the entire grain kernel and are better for your health. Limit refined grains, such as white bread and rice.    + Satisfy hunger with unsaturated fat. Fat helps you feel satisfied. Choose unsaturated fats, such as canola or olive oils, nuts and seeds, oil-based dressings and avocados. Limit saturated fats, which are found in animal products and some plant oils, such as coconut and palm oils.    +  Figure out what kind of calories you are taking in now at this time.  It is hard to change behaviors that you do not understand.      +  Keep your calorie intake at least less than 1400 per day to start (under 1200 total if you want to be more aggressive), divided between 3 smaller meals (try to have no meal more than 500 calories) and 2 snacks.      +  \"Learn what 500 calories looks like\" and try to keep all meals under 500 calories.      + " "Pay attention to portion sizes. Use smaller plates, bowls and glasses. Portion out foods before you eat.    +  Use the \"fork rule\" for all eating. [If you can't pick food up with a fork, then the food will not turn off hunger very well. Using this rule helps patients avoid choosing the wrong types of food, especially if you have had a bariatric surgery operation.   The \"wrong foods\" include liquid calories such as soup, juice, soda, slimfast, ice cream, and beer, crumbly foods such as chips, crackers, and cookies, and starch based foods such as pasta, too much rice, and too much bread.]     + Drink water or unsweetened beverages. Avoid soda, sweetened coffees and teas, energy drinks and sports drinks, which are full of added sugar that your body does not need. Water is always the best option.    +  Drink one large glass of water BEFORE each meal.  This not only helps guard against dehydration, but it also helps provide additional \"fullness\" that will help reduce the amount of food that you will consume in a given meal by helping you fill up earlier.      + Eat mindfully. Take time to fully enjoy your food and pay attention to what you are eating. Make meals last 15 to 30 minutes. This gives your body a chance to become satisfied and tell your brain to stop eating. Pay attention to what you are eating, rather than doing other activities such as watching TV or driving. This helps you pay attention to your body s signals of hunger and fullness.    + Share meals when eating at restaurants, or put half of the entrée in a to-go container before you start eating.    +  Try to eat breakfast every day.  Skipping meals has been shown to be one of the factors that correlates the highest with obesity, (even more than fast food).  Try to avoid the typical carbohydrates and sugars that dominate the typical American breakfast.  Try to get more protein in earlier in the day, this will make you less hungry later.       +  Try High " "Protein Ensure or Boost nutritional supplements (or similar versions) for breakfast if nothing else.      +  I NEVER recommend over the counter diet aids such as Metabolife or Dexatrim since they have a high risk of side effects mainly fast heart rate, insomnia, and nervousness.      Good resources for nutrition are:         --Good Grains:  Oats, brown rice, Quinoa (these do not raise the blood sugar as much)     --Bad grains:  Anything made from wheat or white rice     (because these raise the blood sugars significantly, and the possible gluten issue from wheat for some people).      --Proteins:  Aim for \"lean proteins\" including chicken, fish, seafood, pork, turkey, and eggs (in moderation); Eat red meat only occasionally      ---> \"Wheat Belly\" by Stevie Quiles  (a book about the many bad things processed wheat products (i.e. Bread) have caused in our country...which I believe has serious merit)       --->  \"The Paleo Diet\"  By Anita Neal.             --->\"In Defense of Food\"  Of \"Food Rules\" (Abiodun Perea) a book that goes into the causes obesity epidemic in our country    Sb Perea:                    ---> \"Picture Perfect Weight Loss\" by Enrrique Nur (another good book about choices)        ---> \"Eat This, Not That\" Series,  by Flo Fenton  (a book about food choices in the modern world)              ---> \"The Blood Sugar Solution\" by Dionisio Stinson ( a book about obesity and insulin resistance leading to \"diabesity\")           Behzad Anguiano ( a guidebook for counting calories, and knowing the components of the food that you eat)       \"The Best Life Diet\"  (a complete diet program)             Aim for a Healthy Weight Web Page at www.nhlbi.nih.gov       [place] Diet       Trentonbobo Tineo:  \"Eat More, Weigh Less\" or \"The Program for the Reversal of Heart Disease\"       [facility] [place] web site  the food and nutrition link.       American Heart Association       American Diabetes Association " (www.diabetes.org)              Eat Better ? Move More ? Live Well     Eat 3 nutrient-rich meals each day     Don t skip meals--it will cause you to overeat later in the day!     Eating fiber (vegetables/fruits/whole grains) and protein with meals helps you stay full longer     Choose foods with less than 10 grams of sugar and 5 grams of fat per serving to prevent excess calories and weight re-gain   Eat around the same times each day to develop a routine eating schedule    Avoid snacking unless physically hungry.   Planned snacks: 1-2 times per day and no more than 150 calories    Eat protein first    Protein helps with healing, maintaining adequate muscle mass, reducing hunger and optimizing nutritional status    Aim for 60-80 grams of protein per day   Fill up on Fiber    Fiber comes from plants--fruits, veggies, whole grains, nuts/seeds and beans    Fiber is low in calories, high in phytonutrients and helps you stay full longer    Aim for 25-35 grams per day by eating fiber with meals and snacks  Eat [id number]    Take 20-30 minutes to eat each meal by taking small bites, chewing foods to applesauce consistency or 20-30 times before you swallow    Eating foods too fast can delay satiety/fullness signals and increase overeating   Slow down your eating by using toddler utensils, putting your fork/spoon down between bites and not watching TV or emailing during meals!   Keep a Journal          Writing down what you eat, how you feel and when you are active helps you identify new changes to work on from week to week          Look for ways to cut 100 calories from your current diet 2-3 times per day  Drink 64 ounces of 0-Calorie drinks between meals    Water    Zero calorie Propel  or Vitamin Water      SoBe Lifewater  Zero Calories    Crystal Light , Sugar-Free Pedro-Aid , and other sugar-free lemonade or flavored gibbons    Keep Caffeine to less than 300mg per day ie: 3-6oz cups coffee      Work up to 45-60 minutes  of physical activity most days of the week    Helps with losing weight and prevent regaining those extra pounds!     Do a combo of cardio (walking/water exercises) and strength training (lifting weights/Vinyasa yoga)     Avoid Mindless Eating    Be present when you eat--take note of the smell, taste and quality of your food    Make a list of alternative activities you could do to prevent eating out of boredom/stress  Go for a walk, call a friend, chew gum, paint your nails, re-organize the garage, etc

## 2022-02-17 RX ORDER — ROSUVASTATIN CALCIUM 20 MG/1
20 TABLET, COATED ORAL DAILY
Qty: 90 TABLET | Refills: 0 | Status: SHIPPED | OUTPATIENT
Start: 2022-02-17 | End: 2022-12-19

## 2022-02-24 ENCOUNTER — OFFICE VISIT (OUTPATIENT)
Dept: ALLERGY | Facility: CLINIC | Age: 47
End: 2022-02-24
Attending: INTERNAL MEDICINE
Payer: COMMERCIAL

## 2022-02-24 VITALS — SYSTOLIC BLOOD PRESSURE: 136 MMHG | HEART RATE: 62 BPM | OXYGEN SATURATION: 96 % | DIASTOLIC BLOOD PRESSURE: 85 MMHG

## 2022-02-24 DIAGNOSIS — T78.1XXA ADVERSE REACTION TO FOOD, INITIAL ENCOUNTER: ICD-10-CM

## 2022-02-24 PROBLEM — Z91.013 SHELLFISH ALLERGY: Status: ACTIVE | Noted: 2022-02-24

## 2022-02-24 PROCEDURE — 95004 PERQ TESTS W/ALRGNC XTRCS: CPT | Performed by: ALLERGY & IMMUNOLOGY

## 2022-02-24 PROCEDURE — 99243 OFF/OP CNSLTJ NEW/EST LOW 30: CPT | Mod: 25 | Performed by: ALLERGY & IMMUNOLOGY

## 2022-02-24 NOTE — LETTER
2/24/2022         RE: Tylor Mcgarry  [address]  [facility] [place] [address]        Dear Colleague,    Thank you for referring your patient, Tylor Mcgarry, to the [facility]. Please see a copy of my visit note below.    Tylor Mcgarry was seen in the Allergy Clinic at [facility].    Tylor Mcgarry is a 46 year old Choose not to Answer male being seen today at the request of Dr. Stallings in consultation for allergies.    Tylor reports that he starts to gag, cough, and he starts spitting up phlegm when he eats certain foods. This occurs after he has milk or coffee, or tomato sauce. He tried non-dairy substitutes but even putting these in his coffee he continues to gag and spit up. This begins within a few minutes of eating these foods. He feels like he needs to open a window to get air. These symptoms last a few seconds and then self resolves. He does eat foods containing tomatoes and this does not occur consistently. He continues to drink coffee but now is drinking it black - this still seems to cause symptoms. He is now also avoiding dairy products. He does not develop vomiting or diarrhea.    After eating jalapenos/spicy foods Tylor states he develops an itchy  rash on his chest. The rash persists for 1 to 2 days. He is now avoiding spicy foods.     In the past few months he has also noticed swelling of his gums after eating shellfish. There is no associated itching, lip swelling, throat tightness, difficulty breathing, rash, or vomiting. The swelling persists for approximately 1 day and resolves without treatment. He does not experience these symptoms with any other foods and continues to eat fish.      Past Medical History:   Diagnosis Date     Hyperlipidemia LDL goal <130 2017    , 191     Personal history of COVID-19 12/16/2021     Family History   Problem Relation Age of Onset     Hyperlipidemia Mother       Hyperlipidemia Brother      Hyperlipidemia Sister      Coronary Artery Disease Father 55     Prostate Problems Father         hernias     Hyperlipidemia Brother      History reviewed. No pertinent surgical history.    ENVIRONMENTAL HISTORY:   [place] The home is heated with a forced air. They do have central air conditioning. The patient's bedroom is furnished with carpeting in bedroom, allergen mattress cover, allergen pillowcase cover and fabric window coverings.  Pets inside the house include None. There is no history of cockroach or mice infestation. Do you smoke cigarettes or other recreational drugs? No Do you vape or use an e-cigarette? No. There is/are 0 smokers living in the house. There is/are 0 who smoke ecigarettes/vape living in the house. The house does not have a basement.     SOCIAL HISTORY:   Tylor is employed as . He lives with his spouse.  His spouse works in medical assembly.    REVIEW OF SYSTEMS:  General: negative for weight gain. negative for weight loss. negative for changes in sleep.   Eyes: negative for itching. negative for redness. negative for tearing/watering. negative for vision changes  Ears: negative for fullness. negative for hearing loss. negative for dizziness.   Nose: negative for snoring.negative for changes in smell. negative for drainage.   Throat: negative for hoarseness. negative for sore throat. positive  for trouble swallowing.   Lungs: negative for cough. negative for shortness of breath.negative for wheezing. negative for sputum production.   Cardiovascular: negative for chest pain. negative for swelling of ankles. negative for fast or irregular heartbeat.   Gastrointestinal: negative for nausea. negative for heartburn. negative for acid reflux.   Musculoskeletal: negative for joint pain. negative for joint stiffness. negative for joint swelling.   Neurologic: negative for seizures. negative for fainting.  negative for weakness.   Psychiatric: negative for changes in mood. negative for anxiety.   Endocrine: negative for cold intolerance. negative for heat intolerance. negative for tremors.   Hematologic: negative for easy bruising. negative for easy bleeding.  Integumentary: negative for rash. negative for scaling. negative for nail changes.       Current Outpatient Medications:      rosuvastatin (CRESTOR) 20 MG tablet, Take 1 tablet (20 mg) by mouth daily, Disp: 90 tablet, Rfl: 0  Immunization History   Administered Date(s) Administered     COVID-19,PF,Pfizer (12+ Yrs) 12/14/2021, 01/04/2022     TD (ADULT, 7+) 01/01/2010     Td (Adult), Adsorbed 01/01/2010     No Known Allergies      EXAM:   /85 (BP Location: Right arm, Patient Position: Sitting, Cuff Size: Adult Large)   Pulse 62   SpO2 96%   GENERAL APPEARANCE: alert, cooperative and not in distress  SKIN: no rashes, no lesions  HEAD: atraumatic, normocephalic  EYES: lids and lashes normal, conjunctivae and sclerae clear  ENT: no scars or lesions, nasal exam showed no discharge, swelling or lesions noted, otoscopy showed external auditory canals clear, tympanic membranes normal, tongue midline and normal, soft palate, uvula, and tonsils normal  NECK: no asymmetry, masses, or scars  LUNGS: unlabored respirations, no intercostal retractions or accessory muscle use, clear to auscultation without rales or wheezes  HEART: regular rate and rhythm without murmurs and normal S1 and S2  MUSCULOSKELETAL: no musculoskeletal defects are noted  NEURO: no focal deficits noted  PSYCH: does not appear depressed or anxious    WORKUP: Skin testing    FOOD ALLERGEN PERCUTANEOUS SKIN TESTING  [employer]  2/24/2022   Consent Y   Ordering Physician Dr. Preston   Interpreting Physician Dr. Preston   Testing Technician Cynthia, RN   Location Arm   Time start: 10:29 AM   Time End: 10:44 AM   Positive Control: Histatrol*ALK 1 mg/ml 6/8   Negative Control: 50% Glycerin**Jennifer  Carina 0   Milk, Cow 1:20 (W/F in millimeters) 0   Shrimp 1:20 (W/F in millimeters) 0   Lobster 1:20 (W/F in millimeters) 0   Crab 1:20 (W/F in millimeters) 0   Clam 1:20 (W/F in millimeters) 0   Oyster 1:20 (W/F in millimeters) 0   Scallops 1:20 (W/F in millimeters) 0      Appropriate response to controls, all others negative    ASSESSMENT/PLAN:  Tylor Mcgarry is a 46 year old male here for evaluation of allergies.    1. Adverse reaction to food, initial encounter - Tylor reports he has developed various symptoms after eating several different foods. Dairy products, coffee, and tomato sauces have inconsistently led to acute onset of gagging and coughing that self resolves within seconds. He is able to eat these foods without symptoms on some occasions and at other times he develops symptoms. He also reports symptoms after eating shellfish and spicy foods. We discussed that his reported symptoms are not typical of an IgE mediated hypersensitivity reaction. These may represent a sensitivity or intolerance. Skin testing to shellfish and cow's milk was negative for evidence of allergic sensitization.    - no evidence of IgE mediated food allergies - these foods may be included in the diet as desired/tolerated  - consider evaluation with ENT and/or GI for coughing/gagging after eating  - ALLERGY SKIN TESTS,ALLERGENS      Follow-up in the allergy clinic as needed      Thank you for allowing me to participate in the care of Tylor Mcgarry.      Jackelin Preston MD, FAAAAI  Allergy/Immunology  [facility]      Chart documentation done in part with Dragon Voice Recognition Software. Although reviewed after completion, some word and grammatical errors may remain.      Per provider verbal order, placed Shellfish and milk Panel scratch test.  Consent was obtained prior to procedure.  Once panels were placed, patient was monitored for  15 minutes in clinic.  Provider read test after 15 minutes.  Pt tolerated procedure well.  All questions and concerns were addressed at office visit.     Cynthia HUNTER RN                Again, thank you for allowing me to participate in the care of your patient.        Sincerely,        Jackelin Preston MD

## 2022-02-24 NOTE — PROGRESS NOTES
Per provider verbal order, placed Shellfish and milk Panel scratch test.  Consent was obtained prior to procedure.  Once panels were placed, patient was monitored for 15 minutes in clinic.  Provider read test after 15 minutes.  Pt tolerated procedure well.  All questions and concerns were addressed at office visit.     Cynthia HUNTER RN

## 2022-02-24 NOTE — PROGRESS NOTES
Tylor Mcgarry was seen in the Allergy Clinic at [facility].    Tylor Mcgarry is a 46 year old Choose not to Answer male being seen today at the request of Dr. Stallings in consultation for allergies.    Tylor reports that he starts to gag, cough, and he starts spitting up phlegm when he eats certain foods. This occurs after he has milk or coffee, or tomato sauce. He tried non-dairy substitutes but even putting these in his coffee he continues to gag and spit up. This begins within a few minutes of eating these foods. He feels like he needs to open a window to get air. These symptoms last a few seconds and then self resolves. He does eat foods containing tomatoes and this does not occur consistently. He continues to drink coffee but now is drinking it black - this still seems to cause symptoms. He is now also avoiding dairy products. He does not develop vomiting or diarrhea.    After eating jalapenos/spicy foods Tylor states he develops an itchy  rash on his chest. The rash persists for 1 to 2 days. He is now avoiding spicy foods.     In the past few months he has also noticed swelling of his gums after eating shellfish. There is no associated itching, lip swelling, throat tightness, difficulty breathing, rash, or vomiting. The swelling persists for approximately 1 day and resolves without treatment. He does not experience these symptoms with any other foods and continues to eat fish.      Past Medical History:   Diagnosis Date     Hyperlipidemia LDL goal <130 2017    , 191     Personal history of COVID-19 12/16/2021     Family History   Problem Relation Age of Onset     Hyperlipidemia Mother      Hyperlipidemia Brother      Hyperlipidemia Sister      Coronary Artery Disease Father 55     Prostate Problems Father         hernias     Hyperlipidemia Brother      History reviewed. No pertinent surgical history.    ENVIRONMENTAL HISTORY:   [place]yl[place] The home is heated with a forced air. They do have central air conditioning. The patient's bedroom is furnished with carpeting in bedroom, allergen mattress cover, allergen pillowcase cover and fabric window coverings.  Pets inside the house include None. There is no history of cockroach or mice infestation. Do you smoke cigarettes or other recreational drugs? No Do you vape or use an e-cigarette? No. There is/are 0 smokers living in the house. There is/are 0 who smoke ecigarettes/vape living in the house. The house does not have a basement.     SOCIAL HISTORY:   Tylor is employed as . He lives with his spouse.  His spouse works in medical assembly.    REVIEW OF SYSTEMS:  General: negative for weight gain. negative for weight loss. negative for changes in sleep.   Eyes: negative for itching. negative for redness. negative for tearing/watering. negative for vision changes  Ears: negative for fullness. negative for hearing loss. negative for dizziness.   Nose: negative for snoring.negative for changes in smell. negative for drainage.   Throat: negative for hoarseness. negative for sore throat. positive  for trouble swallowing.   Lungs: negative for cough. negative for shortness of breath.negative for wheezing. negative for sputum production.   Cardiovascular: negative for chest pain. negative for swelling of ankles. negative for fast or irregular heartbeat.   Gastrointestinal: negative for nausea. negative for heartburn. negative for acid reflux.   Musculoskeletal: negative for joint pain. negative for joint stiffness. negative for joint swelling.   Neurologic: negative for seizures. negative for fainting. negative for weakness.   Psychiatric: negative for changes in mood. negative for anxiety.   Endocrine: negative for cold intolerance. negative for heat intolerance. negative for tremors.   Hematologic: negative for easy bruising. negative for easy bleeding.  Integumentary:  negative for rash. negative for scaling. negative for nail changes.       Current Outpatient Medications:      rosuvastatin (CRESTOR) 20 MG tablet, Take 1 tablet (20 mg) by mouth daily, Disp: 90 tablet, Rfl: 0  Immunization History   Administered Date(s) Administered     COVID-19,PF,Pfizer (12+ Yrs) 12/14/2021, 01/04/2022     TD (ADULT, 7+) 01/01/2010     Td (Adult), Adsorbed 01/01/2010     No Known Allergies      EXAM:   /85 (BP Location: Right arm, Patient Position: Sitting, Cuff Size: Adult Large)   Pulse 62   SpO2 96%   GENERAL APPEARANCE: alert, cooperative and not in distress  SKIN: no rashes, no lesions  HEAD: atraumatic, normocephalic  EYES: lids and lashes normal, conjunctivae and sclerae clear  ENT: no scars or lesions, nasal exam showed no discharge, swelling or lesions noted, otoscopy showed external auditory canals clear, tympanic membranes normal, tongue midline and normal, soft palate, uvula, and tonsils normal  NECK: no asymmetry, masses, or scars  LUNGS: unlabored respirations, no intercostal retractions or accessory muscle use, clear to auscultation without rales or wheezes  HEART: regular rate and rhythm without murmurs and normal S1 and S2  MUSCULOSKELETAL: no musculoskeletal defects are noted  NEURO: no focal deficits noted  PSYCH: does not appear depressed or anxious    WORKUP: Skin testing    FOOD ALLERGEN PERCUTANEOUS SKIN TESTING  [facility]  2/24/2022   Consent Y   Ordering Physician Dr. Preston   Interpreting Physician Dr. Preston   Testing Technician AISHWARYA Marie   Location Arm   Time start: 10:29 AM   Time End: 10:44 AM   Positive Control: Histatrol*ALK 1 mg/ml 6/8   Negative Control: 50% Glycerin**Jennifer Carina 0   Milk, Cow 1:20 (W/F in millimeters) 0   Shrimp 1:20 (W/F in millimeters) 0   Lobster 1:20 (W/F in millimeters) 0   Crab 1:20 (W/F in millimeters) 0   Clam 1:20 (W/F in millimeters) 0   Oyster 1:20 (W/F in millimeters) 0   Scallops 1:20 (W/F in millimeters) 0       Appropriate response to controls, all others negative    ASSESSMENT/PLAN:  Tylor Mcgarry is a 46 year old male here for evaluation of allergies.    1. Adverse reaction to food, initial encounter - Tylor reports he has developed various symptoms after eating several different foods. Dairy products, coffee, and tomato sauces have inconsistently led to acute onset of gagging and coughing that self resolves within seconds. He is able to eat these foods without symptoms on some occasions and at other times he develops symptoms. He also reports symptoms after eating shellfish and spicy foods. We discussed that his reported symptoms are not typical of an IgE mediated hypersensitivity reaction. These may represent a sensitivity or intolerance. Skin testing to shellfish and cow's milk was negative for evidence of allergic sensitization.    - no evidence of IgE mediated food allergies - these foods may be included in the diet as desired/tolerated  - consider evaluation with ENT and/or GI for coughing/gagging after eating  - ALLERGY SKIN TESTS,ALLERGENS      Follow-up in the allergy clinic as needed      Thank you for allowing me to participate in the care of Tylor Mcgarry.      Jackelin Preston MD, FAAAAI  Allergy/Immunology  [facility]      Chart documentation done in part with Dragon Voice Recognition Software. Although reviewed after completion, some word and grammatical errors may remain.

## 2022-02-24 NOTE — PATIENT INSTRUCTIONS
If you have any questions regarding your allergies, asthma, or what we discussed during your visit today please call the allergy clinic or contact us via Roundbox.    [facility]: [phone] - call this number with any questions during or after business/clinic hours  [facility] Scheduling Line: [phone]  [facility] Scheduling Line: [phone] - this number is ONLY for scheduling at the [facility] and should not be used to get in touch with the allergy team    All visits for food challenges, medication/drug allergy testing, and drug challenges MUST be scheduled through the allergy clinic nurse. Please call the nurse at [phone] or send a Roundbox message for scheduling. Appointments for these visits that are made through the schedulers or via Roundbox may be cancelled or rescheduled.    Clinic Schedule:   [facility] - Monday, Tuesday, and Thursday  [address] [place] [address]    [facility] - Wednesday  [address] [place] [address]      Allergy testing is all negative    FOOD ALLERGEN PERCUTANEOUS SKIN TESTING  [facility]  2/24/2022   Consent Y   Ordering Physician Dr. Preston   Interpreting Physician Dr. Preston   Testing Technician AISHWARYA Marie   Location Arm   Time start: 10:29 AM   Time End: 10:44 AM   Positive Control: Histatrol*ALK 1 mg/ml 6/8   Negative Control: 50% Glycerin**[place] Carina 0   Milk, Cow 1:20 (W/F in millimeters) 0   Shrimp 1:20 (W/F in millimeters) 0   Lobster 1:20 (W/F in millimeters) 0   Crab 1:20 (W/F in millimeters) 0   Clam 1:20 (W/F in millimeters) 0   Oyster 1:20 (W/F in millimeters) 0   Scallops 1:20 (W/F in millimeters) 0

## 2022-04-05 ENCOUNTER — OFFICE VISIT (OUTPATIENT)
Dept: INTERNAL MEDICINE | Facility: CLINIC | Age: 47
End: 2022-04-05
Payer: COMMERCIAL

## 2022-04-05 VITALS
HEART RATE: 89 BPM | HEIGHT: 69 IN | OXYGEN SATURATION: 98 % | SYSTOLIC BLOOD PRESSURE: 104 MMHG | DIASTOLIC BLOOD PRESSURE: 74 MMHG | WEIGHT: 254 LBS | BODY MASS INDEX: 37.62 KG/M2 | TEMPERATURE: 98 F

## 2022-04-05 DIAGNOSIS — K21.9 GASTROESOPHAGEAL REFLUX DISEASE WITHOUT ESOPHAGITIS: Primary | ICD-10-CM

## 2022-04-05 DIAGNOSIS — K90.49 FOOD INTOLERANCE: ICD-10-CM

## 2022-04-05 DIAGNOSIS — R10.13 DYSPEPSIA: ICD-10-CM

## 2022-04-05 PROCEDURE — 99213 OFFICE O/P EST LOW 20 MIN: CPT | Performed by: INTERNAL MEDICINE

## 2022-04-05 RX ORDER — PANTOPRAZOLE SODIUM 40 MG/1
40 TABLET, DELAYED RELEASE ORAL DAILY
Qty: 90 TABLET | Refills: 0 | Status: SHIPPED | OUTPATIENT
Start: 2022-04-05 | End: 2022-12-19

## 2022-04-05 NOTE — PATIENT INSTRUCTIONS
*  Trial of acid reducing medication:  Pantoprazole 40 mg once per day    *  Check for special bacteria called H. Pylori that has been associated with ulcers and stomach dysfunction.  We check for this usually using either blood tests or stool cultures.  If there is evidence for exposure to this bacteria, then we treat it by using two different antibiotics twice per day for 2 weeks and an anti acid pill such as Prilosec or Prevacid twice daily for 2 weeks.  Then we have you return to your previous dose of anti acids.   We will contact you if these tests are indicating that you need treatment.     *  Follow up via virtual visit in 1-3 [facility] depending on how you are doing.     Patient presents for scheduled  section at term with h/o myomectomy  and 2 previous  sections

## 2022-04-05 NOTE — PROGRESS NOTES
Assessment & Plan   Problem List Items Addressed This Visit     None      Visit Diagnoses     Gastroesophageal reflux disease without esophagitis    -  Primary    Relevant Medications    pantoprazole (PROTONIX) 40 MG EC tablet    Other Relevant Orders    Helicobacter pylori Antigen Stool    Dyspepsia        Relevant Orders    Helicobacter pylori Antigen Stool    Food intolerance               *  Is good to know the patient does not have specific allergy/IgE mediated issues with certain foods.  However this does not preclude the possibility of plain food intolerances.  He should continue avoid any foods that he knows definitely causes regular repeatable problems for him, this just means that should he consume his foods he is not at risk for significant allergic reaction.  Discussed lactose intolerance and gluten intolerances to common examples of such food intolerances.    *The symptoms could also be indicative of underlying gastroesophageal reflux disease.  Also need check for H. pylori.    *  Trial of acid reducing medication:  Pantoprazole 40 mg once per day    *  Check for special bacteria called H. Pylori that has been associated with ulcers and stomach dysfunction.  We check for this usually using either blood tests or stool cultures.  If there is evidence for exposure to this bacteria, then we treat it by using two different antibiotics twice per day for 2 weeks and an anti acid pill such as Prilosec or Prevacid twice daily for 2 weeks.  Then we have you return to your previous dose of anti acids.   We will contact you if these tests are indicating that you need treatment.     *  Follow up via virtual visit in 1-3 [facility] depending on how you are doing.      * If The patient gets relief with pantoprazole continue this medication.    *If the patient does not get relief with pain these measures, then referral to gastroenterology for further evaluation.           BMI:   Estimated body mass index is 37.51 kg/m   "as calculated from the following:    Height as of this encounter: 1.753 m (5' 9\").    Weight as of this encounter: 115.2 kg (254 lb).           Return in about 6 weeks (around 5/17/2022) for Virtual visit (video or phone).    Urban Stallings MD  [facility] Center[facility]ODRIGO Snider is a 46 year old who presents for the following health issues      follow up from Allergy testing    History of Present Illness       Reason for visit:  Concern with my state of health  Symptom onset:  More than a month  Symptoms include:  Nauseous  Symptom intensity:  Moderate  Symptom progression:  Staying the same  Had these symptoms before:  No  What makes it worse:  Not sure  What makes it better:  It comes and goes    He eats 2-3 servings of fruits and vegetables daily.He consumes 1 sweetened beverage(s) daily.He exercises with enough effort to increase his heart rate 10 to 19 minutes per day.  He exercises with enough effort to increase his heart rate 5 days per week.   He is taking medications regularly.       Seen by allergist regarding possible food allergies.    According to the allergy clinic note history:  Tylor reports that he starts to gag, cough, and he starts spitting up phlegm when he eats certain foods. This occurs after he has milk or coffee, or tomato sauce. He tried non-dairy substitutes but even putting these in his coffee he continues to gag and spit up. This begins within a few minutes of eating these foods. He feels like he needs to open a window to get air. These symptoms last a few seconds and then self resolves. He does eat foods containing tomatoes and this does not occur consistently. He continues to drink coffee but now is drinking it black - this still seems to cause symptoms. He is now also avoiding dairy products. He does not develop vomiting or diarrhea.     After eating jalapenos/spicy foods Tylor states he develops an itchy  rash on his chest. The rash persists " "for 1 to 2 days. He is now avoiding spicy foods.      In the past few months he has also noticed swelling of his gums after eating shellfish. There is no associated itching, lip swelling, throat tightness, difficulty breathing, rash, or vomiting. The swelling persists for approximately 1 day and resolves without treatment. He does not experience these symptoms with any other foods and continues to eat fish.    Allergy work-up including blood test and scratch testing did not show any specific IgE allergy mediated dysfunction.    Allergist recommended consideration for gastroesophageal reflux management, possible H. pylori testing versus even intestinal overgrowth evaluation.          Review of Systems   Constitutional, HEENT, cardiovascular, pulmonary, gi and gu systems are negative, except as otherwise noted.      Objective    /74   Pulse 89   Temp 98  F (36.7  C) (Tympanic)   Ht 1.753 m (5' 9\")   Wt 115.2 kg (254 lb)   SpO2 98%   BMI 37.51 kg/m    Body mass index is 37.51 kg/m .  Physical Exam   GENERAL alert and no distress  EYES:  Normal sclera,conjunctiva, EOMI  HENT: facies symmetric  MS: extremities- no gross deformities of the visible extremities noted,   PSYCH: Alert and oriented times 3; speech- coherent  SKIN:  No obvious significant skin lesions on visible portions of face   NEURO:  Normal facial movements.  Appears to move normally                 "

## 2022-09-11 ENCOUNTER — HEALTH MAINTENANCE LETTER (OUTPATIENT)
Age: 47
End: 2022-09-11

## 2022-10-12 ENCOUNTER — ANCILLARY PROCEDURE (OUTPATIENT)
Dept: GENERAL RADIOLOGY | Facility: CLINIC | Age: 47
End: 2022-10-12
Attending: PHYSICIAN ASSISTANT
Payer: OTHER MISCELLANEOUS

## 2022-10-12 ENCOUNTER — OFFICE VISIT (OUTPATIENT)
Dept: URGENT CARE | Facility: URGENT CARE | Age: 47
End: 2022-10-12
Payer: OTHER MISCELLANEOUS

## 2022-10-12 VITALS
OXYGEN SATURATION: 98 % | RESPIRATION RATE: 20 BRPM | TEMPERATURE: 98.2 F | DIASTOLIC BLOOD PRESSURE: 78 MMHG | BODY MASS INDEX: 35.44 KG/M2 | WEIGHT: 240 LBS | HEART RATE: 79 BPM | SYSTOLIC BLOOD PRESSURE: 121 MMHG

## 2022-10-12 DIAGNOSIS — M62.830 SPASM OF BACK MUSCLES: ICD-10-CM

## 2022-10-12 DIAGNOSIS — M62.830 SPASM OF BACK MUSCLES: Primary | ICD-10-CM

## 2022-10-12 PROCEDURE — 99213 OFFICE O/P EST LOW 20 MIN: CPT | Performed by: PHYSICIAN ASSISTANT

## 2022-10-12 PROCEDURE — 72080 X-RAY EXAM THORACOLMB 2/> VW: CPT | Mod: TC | Performed by: RADIOLOGY

## 2022-10-12 PROCEDURE — 72040 X-RAY EXAM NECK SPINE 2-3 VW: CPT | Mod: TC | Performed by: RADIOLOGY

## 2022-10-12 RX ORDER — IBUPROFEN 800 MG/1
800 TABLET, FILM COATED ORAL EVERY 8 HOURS PRN
Qty: 30 TABLET | Refills: 0 | Status: SHIPPED | OUTPATIENT
Start: 2022-10-12 | End: 2023-08-24

## 2022-10-12 RX ORDER — CYCLOBENZAPRINE HCL 10 MG
10 TABLET ORAL 3 TIMES DAILY PRN
Qty: 30 TABLET | Refills: 0 | Status: SHIPPED | OUTPATIENT
Start: 2022-10-12 | End: 2023-06-27

## 2022-10-12 NOTE — LETTER
[facility]  [address] [address]  Phone: [phone]    October 12, 2022        Tylor Mcgarry  [address]   [facility] [place] [facility] [address]          To whom it may concern:    RE: Tylor Mcgarry    Patient was seen and treated today at our clinic and missed work.  Please excuse absences 10/12 - 10/14/22.    Please contact me for questions or concerns.      Sincerely,        Rohit Tran PA-C
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
## 2022-11-21 ENCOUNTER — OFFICE VISIT (OUTPATIENT)
Dept: ORTHOPEDICS | Facility: CLINIC | Age: 47
End: 2022-11-21
Attending: PHYSICIAN ASSISTANT
Payer: OTHER MISCELLANEOUS

## 2022-11-21 VITALS — HEIGHT: 69 IN | BODY MASS INDEX: 35.55 KG/M2 | WEIGHT: 240 LBS

## 2022-11-21 DIAGNOSIS — S76.301A HAMSTRING INJURY, RIGHT, INITIAL ENCOUNTER: ICD-10-CM

## 2022-11-21 DIAGNOSIS — M54.16 LUMBAR BACK PAIN WITH RADICULOPATHY AFFECTING RIGHT LOWER EXTREMITY: Primary | ICD-10-CM

## 2022-11-21 DIAGNOSIS — M62.830 SPASM OF BACK MUSCLES: ICD-10-CM

## 2022-11-21 DIAGNOSIS — M51.369 LUMBAR DEGENERATIVE DISC DISEASE: ICD-10-CM

## 2022-11-21 DIAGNOSIS — M47.816 LUMBAR FACET ARTHROPATHY: ICD-10-CM

## 2022-11-21 PROCEDURE — 99204 OFFICE O/P NEW MOD 45 MIN: CPT | Performed by: STUDENT IN AN ORGANIZED HEALTH CARE EDUCATION/TRAINING PROGRAM

## 2022-11-21 NOTE — PATIENT INSTRUCTIONS
1. Lumbar back pain with radiculopathy affecting right lower extremity    2. Lumbar degenerative disc disease    3. Lumbar facet arthropathy    4. Spasm of back muscles    5. Proximal hamstring injury, right, initial encounter      Tylor Mcgarry is a 47 year old male presenting for evaluation of acute right sided back and neck pain after an injury at work 7.5 weeks ago (Work comp, DOI 10/7/22). Discussed history, exam and X-ray were reviewed today. Etiology is unclear. Evaluation is most suggestive of right sided lumbar radiculopathy in the setting degenerative disc disease and facet arthropathy versus proximal hamstring or gluteus injury. We reviewed treatment options, including pain medication (NSAIDS, oral steriods, muscle relaxants, Gabapentin), activity modification and formal physical therapy. We reviewed indications and timing of advanced imaging (MRI). Given persistent/worsening paresthesias, plan to proceed with lumbar spine and right hip MRI and physicla therapy.     Detailed plan as follows:  - MRI's of the right hip and lumbar spine have been ordered. You may call [phone] to schedule.   - Once you know the date of your MRI's, please schedule a follow-up visit with me ([phone]) for 2 days after that to discuss results.  - In the meantime, a referral has been placed for formal physical therapy. Please call [phone] to schedule. Specific exercises to include centralization with flexion/extension activities with mobilization/manipulation and core stabilization with use of modalities (ie ice, ultrasound, electrical stimulation, etc.) as needed with home exercise prescription.  - Activity as tolerated based on pain. Avoid painful activities but continue gentle movement to prevent stiffness. Gradually increase activity as pain and strength improve with physical therapy.  - May continue cyclobenzaprine (Flexeril) 0.5-1 tab at bedtime as needed for painful muscle spasm. This is sedating so  no driving or alcohol while taking.  - Ibuprofen 400-600 mg with food every 4-6 hours as needed for pain.     Please schedule a follow up appointment to see me after your MRI to review results, or sooner as needed for persistence or worsening of pain. You may call our direct clinic number ([phone]) at any time with questions or concerns.    Sheba Smith MD, CAM  [facility]
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
## 2022-12-05 ENCOUNTER — THERAPY VISIT (OUTPATIENT)
Dept: PHYSICAL THERAPY | Facility: CLINIC | Age: 47
End: 2022-12-05
Attending: STUDENT IN AN ORGANIZED HEALTH CARE EDUCATION/TRAINING PROGRAM
Payer: OTHER MISCELLANEOUS

## 2022-12-05 DIAGNOSIS — M54.16 LUMBAR BACK PAIN WITH RADICULOPATHY AFFECTING RIGHT LOWER EXTREMITY: ICD-10-CM

## 2022-12-05 DIAGNOSIS — M47.816 LUMBAR FACET ARTHROPATHY: ICD-10-CM

## 2022-12-05 DIAGNOSIS — M51.369 LUMBAR DEGENERATIVE DISC DISEASE: ICD-10-CM

## 2022-12-05 DIAGNOSIS — M62.830 SPASM OF BACK MUSCLES: ICD-10-CM

## 2022-12-05 PROCEDURE — 97161 PT EVAL LOW COMPLEX 20 MIN: CPT | Mod: GP | Performed by: PHYSICAL THERAPIST

## 2022-12-05 PROCEDURE — 97110 THERAPEUTIC EXERCISES: CPT | Mod: GP | Performed by: PHYSICAL THERAPIST
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
## 2022-12-09 ENCOUNTER — ANCILLARY PROCEDURE (OUTPATIENT)
Dept: MRI IMAGING | Facility: CLINIC | Age: 47
End: 2022-12-09
Attending: STUDENT IN AN ORGANIZED HEALTH CARE EDUCATION/TRAINING PROGRAM
Payer: OTHER MISCELLANEOUS

## 2022-12-09 DIAGNOSIS — S76.301A HAMSTRING INJURY, RIGHT, INITIAL ENCOUNTER: ICD-10-CM

## 2022-12-09 DIAGNOSIS — M47.816 LUMBAR FACET ARTHROPATHY: ICD-10-CM

## 2022-12-09 DIAGNOSIS — M62.830 SPASM OF BACK MUSCLES: ICD-10-CM

## 2022-12-09 DIAGNOSIS — M51.369 LUMBAR DEGENERATIVE DISC DISEASE: ICD-10-CM

## 2022-12-09 DIAGNOSIS — M54.16 LUMBAR BACK PAIN WITH RADICULOPATHY AFFECTING RIGHT LOWER EXTREMITY: ICD-10-CM

## 2022-12-09 PROCEDURE — 73721 MRI JNT OF LWR EXTRE W/O DYE: CPT | Mod: RT

## 2022-12-09 PROCEDURE — 72148 MRI LUMBAR SPINE W/O DYE: CPT

## 2022-12-12 ENCOUNTER — THERAPY VISIT (OUTPATIENT)
Dept: PHYSICAL THERAPY | Facility: CLINIC | Age: 47
End: 2022-12-12
Payer: OTHER MISCELLANEOUS

## 2022-12-12 DIAGNOSIS — M54.16 LUMBAR BACK PAIN WITH RADICULOPATHY AFFECTING RIGHT LOWER EXTREMITY: ICD-10-CM

## 2022-12-12 PROCEDURE — 97112 NEUROMUSCULAR REEDUCATION: CPT | Mod: GP | Performed by: PHYSICAL THERAPIST

## 2022-12-12 PROCEDURE — 97110 THERAPEUTIC EXERCISES: CPT | Mod: GP | Performed by: PHYSICAL THERAPIST

## 2022-12-12 PROCEDURE — 97530 THERAPEUTIC ACTIVITIES: CPT | Mod: GP | Performed by: PHYSICAL THERAPIST

## 2022-12-15 DIAGNOSIS — M47.816 LUMBAR FACET ARTHROPATHY: ICD-10-CM

## 2022-12-15 DIAGNOSIS — M54.16 LUMBAR BACK PAIN WITH RADICULOPATHY AFFECTING RIGHT LOWER EXTREMITY: Primary | ICD-10-CM

## 2022-12-15 DIAGNOSIS — M51.369 LUMBAR DEGENERATIVE DISC DISEASE: ICD-10-CM

## 2022-12-19 ENCOUNTER — OFFICE VISIT (OUTPATIENT)
Dept: INTERNAL MEDICINE | Facility: CLINIC | Age: 47
End: 2022-12-19
Payer: OTHER MISCELLANEOUS

## 2022-12-19 VITALS
OXYGEN SATURATION: 98 % | HEIGHT: 69 IN | HEART RATE: 103 BPM | WEIGHT: 259.2 LBS | SYSTOLIC BLOOD PRESSURE: 122 MMHG | TEMPERATURE: 99.1 F | BODY MASS INDEX: 38.39 KG/M2 | DIASTOLIC BLOOD PRESSURE: 84 MMHG

## 2022-12-19 DIAGNOSIS — M67.951 TENDINOPATHY OF RIGHT GLUTEAL REGION: ICD-10-CM

## 2022-12-19 DIAGNOSIS — M51.26 HERNIATION OF INTERVERTEBRAL DISC OF LUMBAR SPINE WITHOUT RADICULOPATHY: Primary | ICD-10-CM

## 2022-12-19 PROCEDURE — 99214 OFFICE O/P EST MOD 30 MIN: CPT | Performed by: INTERNAL MEDICINE

## 2022-12-19 RX ORDER — GABAPENTIN 300 MG/1
300 CAPSULE ORAL 2 TIMES DAILY PRN
Qty: 60 CAPSULE | Refills: 1 | Status: SHIPPED | OUTPATIENT
Start: 2022-12-19 | End: 2023-06-27

## 2022-12-19 NOTE — PATIENT INSTRUCTIONS
Referral to Neurosurgery Clinic to evaluate the herniated disc.     --[facility] - [address] [phone]  ([email or web address]     --They will examine you an d decide what the next best step in treatment will be, whether steroid injection, and/or eventual surgery.       Continue physical therapy.      Trial of Gabapentin to help treat the nerve pain.  Take one 300 mg tablet per day in the evening for 3-4 days, then if no better, then take 1 tablet twice per day as needed for nerve pain.  Beware of drowsiness with this medication.     Return to see me if you are going to require surgery.
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
## 2023-01-02 ENCOUNTER — THERAPY VISIT (OUTPATIENT)
Dept: PHYSICAL THERAPY | Facility: CLINIC | Age: 48
End: 2023-01-02
Payer: OTHER MISCELLANEOUS

## 2023-01-02 DIAGNOSIS — M54.16 LUMBAR BACK PAIN WITH RADICULOPATHY AFFECTING RIGHT LOWER EXTREMITY: Primary | ICD-10-CM

## 2023-01-02 PROCEDURE — 97110 THERAPEUTIC EXERCISES: CPT | Mod: GP | Performed by: PHYSICAL THERAPIST

## 2023-01-02 PROCEDURE — 97530 THERAPEUTIC ACTIVITIES: CPT | Mod: GP | Performed by: PHYSICAL THERAPIST

## 2023-01-03 ENCOUNTER — OFFICE VISIT (OUTPATIENT)
Dept: NEUROSURGERY | Facility: CLINIC | Age: 48
End: 2023-01-03
Attending: INTERNAL MEDICINE
Payer: OTHER MISCELLANEOUS

## 2023-01-03 VITALS
BODY MASS INDEX: 38.36 KG/M2 | SYSTOLIC BLOOD PRESSURE: 120 MMHG | DIASTOLIC BLOOD PRESSURE: 83 MMHG | HEIGHT: 69 IN | HEART RATE: 83 BPM | OXYGEN SATURATION: 97 % | WEIGHT: 259 LBS

## 2023-01-03 DIAGNOSIS — M54.16 LUMBAR BACK PAIN WITH RADICULOPATHY AFFECTING RIGHT LOWER EXTREMITY: ICD-10-CM

## 2023-01-03 DIAGNOSIS — M51.369 LUMBAR DEGENERATIVE DISC DISEASE: ICD-10-CM

## 2023-01-03 DIAGNOSIS — M51.26 HERNIATION OF INTERVERTEBRAL DISC OF LUMBAR SPINE WITHOUT RADICULOPATHY: ICD-10-CM

## 2023-01-03 DIAGNOSIS — M47.816 LUMBAR FACET ARTHROPATHY: ICD-10-CM

## 2023-01-03 PROCEDURE — 99203 OFFICE O/P NEW LOW 30 MIN: CPT | Performed by: PHYSICIAN ASSISTANT

## 2023-01-03 ASSESSMENT — PAIN SCALES - GENERAL: PAINLEVEL: MODERATE PAIN (5)

## 2023-01-03 NOTE — NURSING NOTE
"Tylor Mcgarry is a 47 year old male who presents for:  Chief Complaint   Patient presents with     Consult     Herniation of Intervertebral disc of lumbar spine with out sciatica        Initial Vitals:  /83   Pulse 83   Ht 5' 9\" (1.753 m)   Wt 259 lb (117.5 kg)   SpO2 97%   BMI 38.25 kg/m   Estimated body mass index is 38.25 kg/m  as calculated from the following:    Height as of this encounter: 5' 9\" (1.753 m).    Weight as of this encounter: 259 lb (117.5 kg).. Body surface area is 2.39 meters squared. BP completed using cuff size: regular  Moderate Pain (5)        Chelsea Blanca  "
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
## 2023-04-30 ENCOUNTER — HEALTH MAINTENANCE LETTER (OUTPATIENT)
Age: 48
End: 2023-04-30

## 2023-04-30 PROBLEM — M54.16 LUMBAR BACK PAIN WITH RADICULOPATHY AFFECTING RIGHT LOWER EXTREMITY: Status: RESOLVED | Noted: 2022-12-12 | Resolved: 2023-04-30

## 2023-04-30 NOTE — PROGRESS NOTES
DISCHARGE REPORT    Progress reporting period is from 12-5-22 to 1-2-23. Pt completed 3 sessions of PT.       SUBJECTIVE  Subjective changes noted by patient:  Reported an incident two days ago in which a co-worker was unloading a refrigerator from a truck and it slipped-pt reached out to help and noted an increase in his sx's. Better now. Reports pain level can reach 0/10; prolonged sitting still produces tingling in the R foot. Change of position relieves the sx's. Scheduled to meet with a caregiver with the spine and brain clinic on 1-3-23 to review MRI results. Reports performing his HEP multiple times/day; monitoring his sitting posture and avoiding bending (during prayer).    Current pain level is (0-5/10).     Previous pain level was up to 10/10.   Changes in function:  Yes (See Goal flowsheet attached for changes in current functional level)  Adverse reaction to treatment or activity: activity - see above.    OBJECTIVE  Objective info as of 1-2-23:  LROM: FIS: no provocation of sx's. Adrian's: negative. EIS: slight provocation of sx's posterior upper thigh; decrease of sx's with reps.     ASSESSMENT/PLAN  Updated problem list and treatment plan: Diagnosis 1:  R lumbar radiculopathy   STG/LTGs have been met or progress has been made towards goals:  Yes (See Goal flow sheet completed today.)  Assessment of Progress: The patient has met all of their long term goals.  Self Management Plans:  Patient has been instructed in a home treatment program.  Tylor continues to require the following intervention to meet STG and LTG's:  No further PT scheduled following last visit; pending options provided at follow-up with provider on 1-3-23.    Recommendations:  Discharge.    Please refer to the daily flowsheet for treatment today, total treatment time and time spent performing 1:1 timed codes.

## 2023-06-27 ENCOUNTER — OFFICE VISIT (OUTPATIENT)
Dept: INTERNAL MEDICINE | Facility: CLINIC | Age: 48
End: 2023-06-27
Payer: OTHER MISCELLANEOUS

## 2023-06-27 VITALS
DIASTOLIC BLOOD PRESSURE: 80 MMHG | WEIGHT: 252.4 LBS | SYSTOLIC BLOOD PRESSURE: 126 MMHG | OXYGEN SATURATION: 97 % | BODY MASS INDEX: 37.38 KG/M2 | TEMPERATURE: 98.2 F | HEIGHT: 69 IN | HEART RATE: 77 BPM

## 2023-06-27 DIAGNOSIS — E66.01 SEVERE OBESITY (BMI 35.0-35.9 WITH COMORBIDITY) (H): ICD-10-CM

## 2023-06-27 DIAGNOSIS — M62.830 SPASM OF BACK MUSCLES: ICD-10-CM

## 2023-06-27 DIAGNOSIS — M51.26 HERNIATION OF INTERVERTEBRAL DISC OF LUMBAR SPINE WITHOUT RADICULOPATHY: Primary | ICD-10-CM

## 2023-06-27 PROCEDURE — 99214 OFFICE O/P EST MOD 30 MIN: CPT | Performed by: INTERNAL MEDICINE

## 2023-06-27 RX ORDER — GABAPENTIN 300 MG/1
300 CAPSULE ORAL 2 TIMES DAILY PRN
Qty: 60 CAPSULE | Refills: 1 | Status: CANCELLED | OUTPATIENT
Start: 2023-06-27

## 2023-06-27 RX ORDER — CYCLOBENZAPRINE HCL 10 MG
10 TABLET ORAL PRN
Status: CANCELLED | OUTPATIENT
Start: 2023-06-27

## 2023-06-27 RX ORDER — GABAPENTIN 300 MG/1
300 CAPSULE ORAL 2 TIMES DAILY PRN
Qty: 60 CAPSULE | Refills: 1 | Status: SHIPPED | OUTPATIENT
Start: 2023-06-27

## 2023-06-27 RX ORDER — CYCLOBENZAPRINE HCL 10 MG
5-10 TABLET ORAL 3 TIMES DAILY PRN
Qty: 30 TABLET | Refills: 1 | Status: SHIPPED | OUTPATIENT
Start: 2023-06-27

## 2023-06-27 ASSESSMENT — PAIN SCALES - GENERAL: PAINLEVEL: EXTREME PAIN (8)
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
## 2023-06-27 NOTE — PATIENT INSTRUCTIONS
Physical therapy for the lower back    Physical Therapy at [facility] as requested.      If you are not much better after 4-6 weeks, then you need to return to see the Neurosurgery Clinic for further evaluation and to set up further treatments which may include steroid injections, or even surgery if the symptoms are bad enough.  The specialist will determine what you need next     [facility] [phone]  ([email or web address]

## 2023-06-27 NOTE — LETTER
June 27, 2023      Tylor Mcgarry  [address]   [place] [address]        To Whom It May Concern:    Tylor Mcgarry was seen in our clinic today for continued low back pain due to herniated lumbar disc with radiculopathy.   We have prescribed treatments for this matter including referrals to physical therapists and spine specialists, but he continues to have symptoms that limit his physical abilities at work and at home.    He may return to work today with the following for the next 3 months: limited to light duty - lifting no greater than 20 pounds, no bending/stooping, standing limited to 4 hrs per 8 hour day, and walking limited to 2 hours per 8 hour day.        Sincerely,        Urban Stallings MD

## 2023-07-17 ENCOUNTER — TRANSFERRED RECORDS (OUTPATIENT)
Dept: HEALTH INFORMATION MANAGEMENT | Facility: CLINIC | Age: 48
End: 2023-07-17
Payer: COMMERCIAL

## 2023-08-07 ENCOUNTER — TRANSFERRED RECORDS (OUTPATIENT)
Dept: HEALTH INFORMATION MANAGEMENT | Facility: CLINIC | Age: 48
End: 2023-08-07
Payer: COMMERCIAL

## 2023-08-24 DIAGNOSIS — M62.830 SPASM OF BACK MUSCLES: ICD-10-CM

## 2023-08-25 RX ORDER — IBUPROFEN 800 MG/1
800 TABLET, FILM COATED ORAL EVERY 8 HOURS PRN
Qty: 60 TABLET | Refills: 0 | Status: SHIPPED | OUTPATIENT
Start: 2023-08-25

## 2023-09-07 ENCOUNTER — TRANSFERRED RECORDS (OUTPATIENT)
Dept: HEALTH INFORMATION MANAGEMENT | Facility: CLINIC | Age: 48
End: 2023-09-07
Payer: COMMERCIAL

## 2023-11-15 ENCOUNTER — TRANSFERRED RECORDS (OUTPATIENT)
Dept: HEALTH INFORMATION MANAGEMENT | Facility: CLINIC | Age: 48
End: 2023-11-15
Payer: COMMERCIAL

## 2024-01-02 ENCOUNTER — MYC MEDICAL ADVICE (OUTPATIENT)
Dept: INTERNAL MEDICINE | Facility: CLINIC | Age: 49
End: 2024-01-02
Payer: COMMERCIAL

## 2024-01-08 ENCOUNTER — OFFICE VISIT (OUTPATIENT)
Dept: INTERNAL MEDICINE | Facility: CLINIC | Age: 49
End: 2024-01-08
Payer: OTHER MISCELLANEOUS

## 2024-01-08 VITALS
HEART RATE: 82 BPM | DIASTOLIC BLOOD PRESSURE: 76 MMHG | HEIGHT: 69 IN | OXYGEN SATURATION: 97 % | BODY MASS INDEX: 38.49 KG/M2 | SYSTOLIC BLOOD PRESSURE: 122 MMHG | WEIGHT: 259.9 LBS | TEMPERATURE: 98.1 F

## 2024-01-08 DIAGNOSIS — M51.26 HERNIATION OF INTERVERTEBRAL DISC OF LUMBAR SPINE WITHOUT RADICULOPATHY: ICD-10-CM

## 2024-01-08 DIAGNOSIS — M62.830 SPASM OF BACK MUSCLES: Primary | ICD-10-CM

## 2024-01-08 PROCEDURE — 99213 OFFICE O/P EST LOW 20 MIN: CPT | Performed by: INTERNAL MEDICINE

## 2024-01-08 RX ORDER — IBUPROFEN 800 MG/1
800 TABLET, FILM COATED ORAL EVERY 8 HOURS PRN
Qty: 60 TABLET | Refills: 0 | Status: CANCELLED | OUTPATIENT
Start: 2024-01-08

## 2024-01-08 ASSESSMENT — PAIN SCALES - GENERAL: PAINLEVEL: MILD PAIN (3)

## 2024-01-08 ASSESSMENT — PATIENT HEALTH QUESTIONNAIRE - PHQ9
10. IF YOU CHECKED OFF ANY PROBLEMS, HOW DIFFICULT HAVE THESE PROBLEMS MADE IT FOR YOU TO DO YOUR WORK, TAKE CARE OF THINGS AT HOME, OR GET ALONG WITH OTHER PEOPLE: NOT DIFFICULT AT ALL
SUM OF ALL RESPONSES TO PHQ QUESTIONS 1-9: 4
SUM OF ALL RESPONSES TO PHQ QUESTIONS 1-9: 4

## 2024-01-08 NOTE — COMMUNITY RESOURCES LIST (ENGLISH)
01/08/2024   [facility]  N/A  For questions about this resource list or additional care needs, please contact your primary care clinic or care manager.  Phone: [phone]   Email: N/A   Address: [address] [place] [address]   Hours: N/A        Financial Stability       Rent and mortgage payment assistance  1  [facility] Distance: 0.37 miles      In-Person, Phone/Virtual   [address] [place] [address]  Language: English, Ethiopian  Hours: Mon - Fri 9:00 AM - 4:30 PM  Fees: Free   Phone: (670) 144-3299 Ext.113 Email: info@[employer] Website: https://[employer].org     2  [facility] [employer] ([facility]) Distance: 6.22 miles      In-Person   [address] [place] [address] [place] [address]  Language: English  Hours: Mon - Fri 9:00 AM - 4:00 PM  Fees: Free   Phone: (865) 666-2630 Email: info@[facility].mn. Website: http://www.[employer]/     Utility payment assistance  3  [facility] Distance: 0.37 miles      In-Person, Phone/Virtual   [address] [place] [address]  Language: English, Ethiopian  Hours: Mon - Fri 9:00 AM - 4:30 PM  Fees: Free   Phone: (846) 658-1830 Ext.113 Email: info@[employer] Website: https://[employer].org     4  [facility] - Utility payment assistance Distance: 7.01 miles      In-Person, Phone/Virtual   [address] [place] [address]  Language: English  Hours: Mon - Thu 9:00 AM - 3:00 PM  Fees: Free   Phone: (383) 501-4294 Email: [employer]@[facility].org Website: http://www.[facility].org/care-ministries/          Food and Nutrition       Food pantry  5  [facility] Distance: 0.37 miles      In-Person, Pickup   [address] [place] [address]  Language: English, Ethiopian  Hours: Mon - Wed 9:00 AM - 4:30 PM Appt. Only, Thu 9:00 AM - 6:30 PM Appt. Only, Fri 9:00 AM - 4:30 PM Appt. Only  Fees: Free    Phone: (352) 202-6658 Ext.100 Email: info@[employer] Website: https://[employer]     6  [facility] - [employer] Distance: 1.17 miles      Pickup   [address] [place] [place] [address]  Language: English, Brazilian  Hours: Fri 10:00 AM - 1:00 PM  Fees: Self Pay   Phone: (472) 323-5372 Email: negar@[facility] Website: https://www.[facility]/fhsyrrazl-qcjkapqvi-mdvxch     SNAP application assistance  7  [facility] Distance: 0.37 miles      In-Person, Phone/Virtual   [address] [place] [address]  Language: English, Brazilian  Hours: Mon - Fri 9:00 AM - 4:30 PM  Fees: Free   Phone: (202) 866-3439 Ext.113 Email: info@[employer] Website: https://[employer]     8  [employer] [facility] Distance: 8.43 miles      In-Person   [address] [place] [address]  Language: English, Brazilian  Hours: Mon - Fri 8:30 AM - 5:00 PM  Fees: Free   Phone: (600) 647-7933 Email: info@[employer].org Website: http://www.[employer].org/     Soup kitchen or free meals  9  [employer] Distance: 1.55 miles      Pickup   [address] [place] [address]  Language: English  Hours: Mon - Fri 5:00 PM - 6:00 PM  Fees: Free   Phone: (674) 805-5664 Email: contactus@[employer].org Website: https://www.[employer].org/     10  [employer] Distance: 2.96 miles      Pickup   [address] [place] [address]  Language: English  Hours: Sat 5:00 PM - 7:00 PM , Sun 5:30 PM - 6:30 PM  Fees: Free   Phone: (328) 180-8052 Email: office@[employer] Website: http://[employer].org/          Important Numbers & Websites       Emergency Services   911  [facility] Services   [facility]  Poison Control   (447) 215-9009  Suicide Prevention Lifeline   (402) 721-7684 (TALK)  Child Abuse Hotline   (518) 219-6718 (4-A-Child)  Sexual Assault Hotline   (550) 890-2419 (HOPE)  [facility]    (803) 750-2355 (RUNAWAY)  All-Options Talkline   (753) 324-4622  Substance Abuse Referral   ([phone]) 309-5810 (HELP)

## 2024-01-08 NOTE — PROGRESS NOTES
"  Assessment & Plan     (M62.830) Spasm of back muscles  (primary encounter diagnosis)  Comment: was hopping form more physical therapy.   Based on the physical therapist kehinde stating that he has plateued and has reachned maximal medical improvement, I doubt further PT will be beneficial.  The patient agreed.   He will return if his symptoms change or worsen.   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   He will return if any worsening.   Plan:                  BMI:   Estimated body mass index is 38.38 kg/m  as calculated from the following:    Height as of this encounter: 1.753 m (5' 9\").    Weight as of this encounter: 117.9 kg (259 lb 14.4 oz).           Urban Stallings MD  [facility]    Earlene Snider is a 48 year old, presenting for the following health issues:  RECHECK (Follow up work comp and PT)        1/8/2024     5:15 PM   Additional Questions   Roomed by Rosette CARRANZA CMA     Follow up Work Comp and physical therapy  Which is helpful    History of Present Illness       Back Pain:  He presents for follow up of back pain. Patient's back pain is a recurring problem.  Location of back pain:  Right lower back and left lower back  Description of back pain: burning, dull ache and sharp  Back pain spreads: right buttocks, right thigh and right foot    Since last visit, how has back pain changed: always present, but gets better and worse  Since patient first noticed back pain, pain is: always present, but gets better and worse  Does back pain interfere with his job:  Yes       He eats 2-3 servings of fruits and vegetables daily.He consumes 1 sweetened beverage(s) daily.He exercises with enough effort to increase his heart rate 20 to 29 minutes per day.  He exercises with enough effort to increase his heart rate 5 days per week.   He is taking medications regularly.       Reports that he has " "overall improved since lat year through physical therapy.     Reviewed PT notes, in clluding the most recent note from November where the PT states that he has plateued, and likely has reache dmaximal medical improvement.     Patietn still remain careful about lifting and imposes lifting and activtyi restrictiosn on himself.     No radicual sx at this time, no bowel or bladder incontinence or control issues.       **I reviewed the information recorded in the patient's EPIC chart (including but not limited to medical history, surgical history, family history, problem list, medication list, and allergy list) and updated the information as indicated based on the patients reported information.         Review of Systems   Constitutional, HEENT, cardiovascular, pulmonary, gi and gu systems are negative, except as otherwise noted.      Objective    /76   Pulse 82   Temp 98.1  F (36.7  C) (Tympanic)   Ht 1.753 m (5' 9\")   Wt 117.9 kg (259 lb 14.4 oz)   SpO2 97%   BMI 38.38 kg/m    Body mass index is 38.38 kg/m .  Physical Exam   GENERAL alert and no distress  EYES:  Normal sclera,conjunctiva, EOMI  HENT: facies symmetric  MS: extremities- no gross deformities of the visible extremities noted,   PSYCH: Alert and oriented times 3; speech- coherent  SKIN:  No obvious significant skin lesions on visible portions of face   NEURO:  Normal facial movements.  Appears to move normally   Moves up and out of the chair easily, in fludi coordinated manner, gait normal.  No visible or obviosu loss of strength or coordination                    "

## 2024-01-09 NOTE — TELEPHONE ENCOUNTER
"Discussed in appointment 1/8/2024     Reviewed his last PT note from 11/15/23 which reported that he had plateaued in his treatment and that he had reached \"maximal medical improvement\", while further physical therapy might be helpful, it was not necessary given the circumstance.  "

## 2024-05-04 ENCOUNTER — HEALTH MAINTENANCE LETTER (OUTPATIENT)
Age: 49
End: 2024-05-04

## 2024-11-06 ENCOUNTER — NURSE TRIAGE (OUTPATIENT)
Dept: INTERNAL MEDICINE | Facility: CLINIC | Age: 49
End: 2024-11-06
Payer: COMMERCIAL

## 2024-11-06 DIAGNOSIS — E78.5 HYPERLIPIDEMIA LDL GOAL <130: Primary | ICD-10-CM

## 2024-11-06 DIAGNOSIS — Z13.29 SCREENING FOR THYROID DISORDER: ICD-10-CM

## 2024-11-06 DIAGNOSIS — Z77.120 MOLD EXPOSURE: ICD-10-CM

## 2024-11-06 DIAGNOSIS — E66.01 SEVERE OBESITY (BMI 35.0-35.9 WITH COMORBIDITY) (H): ICD-10-CM

## 2024-11-06 DIAGNOSIS — Z13.6 CARDIOVASCULAR SCREENING; LDL GOAL LESS THAN 130: ICD-10-CM

## 2024-11-06 NOTE — TELEPHONE ENCOUNTER
Called and left a detailed message with PCP's response below     Asking that he call back if any questions     Indigo GAVIRIA, Triage RN  [facility]

## 2024-11-06 NOTE — TELEPHONE ENCOUNTER
It's hard to say if any of his symptoms are truly due to mold exposure.   There are really no specific blood tests specifically related to mold exposure, beyond basic blood chemistry panel, and blood counts.      He is due for cholesterol labs due to hyperlipidemia when last checked in 2022.     I entered orders, if he gets these done before the appointment then we can review the results when he is in, otherwise we can draw them when he is here.     We can discuss this further at the time he is seen.     Close encounter when done.

## 2024-11-06 NOTE — TELEPHONE ENCOUNTER
Order/Referral Request    Who is requesting: Patient    Orders being requested: Patient said he has been exposed to mold for about two months now and would like to get blood work done for it before his appointment on 11/12 with Dr. Stallings. He's been experiencing symptoms such as body aches, mood swings, lots of mucus    Reason service is needed/diagnosis: Patient exposed to mold     When are orders needed by: ASAP     Has this been discussed with Provider: No    Does patient have a preference on a Group/Provider/Facility? [facility]    Does patient have an appointment scheduled?: Yes: Patient has an appointment on 11/12     Where to send orders: Place orders within Epic    Could we send this information to you in [place] or would you prefer to receive a phone call?:   No preference   Okay to leave a detailed message?: Yes at Home number on file [phone] (home)

## 2024-11-06 NOTE — TELEPHONE ENCOUNTER
Called and spoke with pt to gather more information.     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    The mold was tested by a specialist and identified as black mold on 9/6/24.   He was exposed in mid-August.     He now reports having severe body aches, mood swings (things that did not bother him before agitate him now), feeling like he is warmer than usual, and congestion.     He is wondering if there are any lab tests he can have completed prior to his 11/12/24 OV.   Pt is very concerned.     Routing to PCP to advise and place any orders if appropriate.           Can we leave a detailed message on this number? YES  Phone number patient can be reached at: Home number on file [phone] (home)    Makayla Contreras, RN  [facility] Triage      Reason for Disposition   Nursing judgment    Additional Information   Negative: Sounds like a life-threatening emergency to the triager   Negative: Information only call about a Well Adult (no illness or injury)   Negative: Caller can't be reached by phone   Negative: Nursing judgment   Negative: Nursing judgment   Negative: Nursing judgment   Negative: Nursing judgment    Protocols used: No Protocol [id number]

## 2024-11-12 ENCOUNTER — OFFICE VISIT (OUTPATIENT)
Dept: INTERNAL MEDICINE | Facility: CLINIC | Age: 49
End: 2024-11-12
Payer: COMMERCIAL

## 2024-11-12 VITALS
WEIGHT: 237.9 LBS | BODY MASS INDEX: 35.24 KG/M2 | DIASTOLIC BLOOD PRESSURE: 76 MMHG | OXYGEN SATURATION: 98 % | SYSTOLIC BLOOD PRESSURE: 122 MMHG | TEMPERATURE: 98.4 F | HEIGHT: 69 IN | HEART RATE: 78 BPM | RESPIRATION RATE: 14 BRPM

## 2024-11-12 DIAGNOSIS — Z13.6 CARDIOVASCULAR SCREENING; LDL GOAL LESS THAN 130: ICD-10-CM

## 2024-11-12 DIAGNOSIS — M62.830 SPASM OF BACK MUSCLES: ICD-10-CM

## 2024-11-12 DIAGNOSIS — Z00.01 ENCOUNTER FOR ROUTINE ADULT MEDICAL EXAM WITH ABNORMAL FINDINGS: Primary | ICD-10-CM

## 2024-11-12 DIAGNOSIS — Z77.120 EXPOSURE TO MOLD: ICD-10-CM

## 2024-11-12 DIAGNOSIS — Z13.29 SCREENING FOR THYROID DISORDER: ICD-10-CM

## 2024-11-12 DIAGNOSIS — Z11.4 SCREENING FOR HIV (HUMAN IMMUNODEFICIENCY VIRUS): ICD-10-CM

## 2024-11-12 DIAGNOSIS — R53.82 CHRONIC FATIGUE: ICD-10-CM

## 2024-11-12 DIAGNOSIS — Z11.59 NEED FOR HEPATITIS C SCREENING TEST: ICD-10-CM

## 2024-11-12 DIAGNOSIS — E66.01 SEVERE OBESITY (BMI 35.0-35.9 WITH COMORBIDITY) (H): ICD-10-CM

## 2024-11-12 DIAGNOSIS — R52 GENERALIZED BODY ACHES: ICD-10-CM

## 2024-11-12 DIAGNOSIS — E78.5 HYPERLIPIDEMIA LDL GOAL <130: ICD-10-CM

## 2024-11-12 DIAGNOSIS — Z12.11 SCREEN FOR COLON CANCER: ICD-10-CM

## 2024-11-12 LAB
ALBUMIN SERPL BCG-MCNC: 4.5 G/DL (ref 3.5–5.2)
ALP SERPL-CCNC: 78 U/L (ref 40–150)
ALT SERPL W P-5'-P-CCNC: 25 U/L (ref 0–70)
ANION GAP SERPL CALCULATED.3IONS-SCNC: 11 MMOL/L (ref 7–15)
AST SERPL W P-5'-P-CCNC: 21 U/L (ref 0–45)
BILIRUB SERPL-MCNC: 0.7 MG/DL
BUN SERPL-MCNC: 13.4 MG/DL (ref 6–20)
CALCIUM SERPL-MCNC: 9 MG/DL (ref 8.8–10.4)
CHLORIDE SERPL-SCNC: 103 MMOL/L (ref 98–107)
CHOLEST SERPL-MCNC: 247 MG/DL
CREAT SERPL-MCNC: 0.79 MG/DL (ref 0.67–1.17)
CRP SERPL-MCNC: 3.92 MG/L
EGFRCR SERPLBLD CKD-EPI 2021: >90 ML/MIN/1.73M2
ERYTHROCYTE [DISTWIDTH] IN BLOOD BY AUTOMATED COUNT: 12.9 % (ref 10–15)
ERYTHROCYTE [SEDIMENTATION RATE] IN BLOOD BY WESTERGREN METHOD: 6 MM/HR (ref 0–15)
FASTING STATUS PATIENT QL REPORTED: YES
FASTING STATUS PATIENT QL REPORTED: YES
GLUCOSE SERPL-MCNC: 95 MG/DL (ref 70–99)
HCO3 SERPL-SCNC: 26 MMOL/L (ref 22–29)
HCT VFR BLD AUTO: 47.4 % (ref 40–53)
HCV AB SERPL QL IA: NONREACTIVE
HDLC SERPL-MCNC: 46 MG/DL
HGB BLD-MCNC: 15.4 G/DL (ref 13.3–17.7)
LDLC SERPL CALC-MCNC: 174 MG/DL
MCH RBC QN AUTO: 27.5 PG (ref 26.5–33)
MCHC RBC AUTO-ENTMCNC: 32.5 G/DL (ref 31.5–36.5)
MCV RBC AUTO: 85 FL (ref 78–100)
NONHDLC SERPL-MCNC: 201 MG/DL
PLATELET # BLD AUTO: 286 10E3/UL (ref 150–450)
POTASSIUM SERPL-SCNC: 3.8 MMOL/L (ref 3.4–5.3)
PROT SERPL-MCNC: 7.5 G/DL (ref 6.4–8.3)
RBC # BLD AUTO: 5.61 10E6/UL (ref 4.4–5.9)
SODIUM SERPL-SCNC: 140 MMOL/L (ref 135–145)
TRIGL SERPL-MCNC: 135 MG/DL
TSH SERPL DL<=0.005 MIU/L-ACNC: 1.2 UIU/ML (ref 0.3–4.2)
VIT B12 SERPL-MCNC: 544 PG/ML (ref 232–1245)
WBC # BLD AUTO: 7.6 10E3/UL (ref 4–11)

## 2024-11-12 PROCEDURE — 86803 HEPATITIS C AB TEST: CPT | Performed by: INTERNAL MEDICINE

## 2024-11-12 PROCEDURE — 36415 COLL VENOUS BLD VENIPUNCTURE: CPT | Performed by: INTERNAL MEDICINE

## 2024-11-12 PROCEDURE — 86140 C-REACTIVE PROTEIN: CPT | Performed by: INTERNAL MEDICINE

## 2024-11-12 PROCEDURE — 80053 COMPREHEN METABOLIC PANEL: CPT | Performed by: INTERNAL MEDICINE

## 2024-11-12 PROCEDURE — 99396 PREV VISIT EST AGE 40-64: CPT | Performed by: INTERNAL MEDICINE

## 2024-11-12 PROCEDURE — 84443 ASSAY THYROID STIM HORMONE: CPT | Performed by: INTERNAL MEDICINE

## 2024-11-12 PROCEDURE — 87389 HIV-1 AG W/HIV-1&-2 AB AG IA: CPT | Performed by: INTERNAL MEDICINE

## 2024-11-12 PROCEDURE — 82607 VITAMIN B-12: CPT | Performed by: INTERNAL MEDICINE

## 2024-11-12 PROCEDURE — 80061 LIPID PANEL: CPT | Performed by: INTERNAL MEDICINE

## 2024-11-12 PROCEDURE — 85027 COMPLETE CBC AUTOMATED: CPT | Performed by: INTERNAL MEDICINE

## 2024-11-12 PROCEDURE — 85652 RBC SED RATE AUTOMATED: CPT | Performed by: INTERNAL MEDICINE

## 2024-11-12 RX ORDER — ROSUVASTATIN CALCIUM 20 MG/1
20 TABLET, COATED ORAL DAILY
Qty: 90 TABLET | Refills: 3 | Status: SHIPPED | OUTPATIENT
Start: 2024-11-12

## 2024-11-12 RX ORDER — IBUPROFEN 800 MG/1
800 TABLET, FILM COATED ORAL EVERY 8 HOURS PRN
Qty: 60 TABLET | Refills: 5 | Status: SHIPPED | OUTPATIENT
Start: 2024-11-12

## 2024-11-12 SDOH — HEALTH STABILITY: PHYSICAL HEALTH: ON AVERAGE, HOW MANY DAYS PER WEEK DO YOU ENGAGE IN MODERATE TO STRENUOUS EXERCISE (LIKE A BRISK WALK)?: 2 DAYS

## 2024-11-12 ASSESSMENT — SOCIAL DETERMINANTS OF HEALTH (SDOH): HOW OFTEN DO YOU GET TOGETHER WITH FRIENDS OR RELATIVES?: ONCE A WEEK

## 2024-11-12 ASSESSMENT — PAIN SCALES - GENERAL: PAINLEVEL_OUTOF10: MODERATE PAIN (5)

## 2024-11-12 NOTE — PROGRESS NOTES
Preventive Care Visit  [facility]  Urban Stallings MD, Internal Medicine  Nov 12, 2024      Assessment & Plan     (Z00.01) Encounter for routine adult medical exam with abnormal findings  (primary encounter diagnosis)  Comment: Discussed cardiac disease risk factor modification including screening, preventing, and treating hypertension, elevated lipids, diabetes, and smoking cessation.    Discussed age appropriate cancer screening recommendations as dictated by age group and past medical history.    Recommended making better food choices as often as possible, including lower carb, lower fat, lower salt diet and moderation in any alcohol intake.    Recommended maintaining regular physical activity/exercise throughout their lifetime.  Recommended safety and injury prevention (i.e. seatbelt use, safety equipment like helmets when biking, etc).    Reviewed preventive health counseling, as reflected in patient instructions       Regular exercise       Healthy diet/nutrition       Vision screening       Hearing screening       Immunizations  Strongly recommended updating all of his vaccines, prioritizing the annual influenza vaccine and the most current COVID booster given the start of the influenza season here in [place].  He is also due for a tetanus vaccine update  I offered to give him any and all these vaccines today, he declined to receive vaccines today.  He can receive these vaccines at any pharmacy if he changes mind.         Colorectal cancer screening       Prostate cancer screening   Plan: REVIEW OF HEALTH MAINTENANCE PROTOCOL ORDERS            (E66.01,  Z68.35) Severe obesity (BMI 35.0-35.9 with comorbidity) (H)  Comment: Obesity is contributing to hyperlipidemia.  Current BMI is: Body mass index is 35.13 kg/m ..  Reviewed weight patterns.   Obesity as a biological preventable and treatable disease, which is associated with significantly increased risk of many acute and chronic  health conditions.   Obesity has now been recognized as a chronic disease by the American Medical Association.  Obesity has serious co-morbidities associated with obesity including increased risk for hypertension, stroke, coronary artery disease, dyslipidemia, Type II diabetes, depression, sleep apnea, cancers of the colon, breast and endometrium, obstructive sleep apnea, osteoarthritis and female infertility.  Recommended regular aerobic exercise, recommended improved diet aiming at lowering amount of processed foods, lower sugars, moderation/reduction of simple carbs and fat in the diet, establishing more regular meal times, always eating breakfast, front loading some of the calories and adding more protein to the diet.     Referral to [facility] Clinic for discussion of more aggressive measures for weight loss can be considered (including medical options (e.g. GLP-1, or appetite suppressants, etc.) or bariatric surgical procedures.   Plan: REVIEW OF HEALTH MAINTENANCE PROTOCOL ORDERS,         CANCELED: Lipid panel reflex to direct LDL         Fasting, CANCELED: Basic metabolic panel,         CANCELED: AST, CANCELED: ALT            (E78.5) Hyperlipidemia LDL goal <130  Comment: Long history of elevated cholesterol, most likely based on genetic factors based on his family history.  Irregardless, his greatly elevated LDL cholesterol requires treatment to lower his future cardiac risk.  Recommend starting rosuvastatin as I have done numerous times in the past.  He never took any of the previous prescriptions.  Reviewed the mechanism of action of cholesterol lowering, and the most common side effects.  He will stop the medicine and contact us if he develops any significant side effects.  Repeat labs after his first 3 months of medicine.  Plan: REVIEW OF HEALTH MAINTENANCE PROTOCOL ORDERS,         rosuvastatin (CRESTOR) 20 MG tablet, Lipid         panel reflex to direct LDL Fasting, ALT,         CANCELED: Lipid panel  reflex to direct LDL         Fasting, CANCELED: Basic metabolic panel,         CANCELED: AST, CANCELED: ALT            (R53.82) Chronic fatigue  Comment: No obvious medical cause for the fatigue.  I am not sure if the mold is playing a role, and how to test for it.  Will check for routine medical cause with the labs as ordered, suspect they will return abnormal.  Will follow up any abnormal labs as indicated.  D  Nonspecific fatigue may have other causes including stress and problems at home or work, or may be associated with underlying mood disorders in general.    Other causes may include sleep apnea, chronic fatigue syndrome, post viral syndromes, fibromyalgia, connective tissue disease etc.   Plan: REVIEW OF HEALTH MAINTENANCE PROTOCOL ORDERS,         ALT, Vitamin B12, Adult Infectious Disease          Referral, CANCELED: ESR: Erythrocyte         sedimentation rate, CANCELED: CRP inflammation,        CANCELED: TSH with free T4 reflex            (R52) Generalized body aches  Comment: As above, I am not sure what role the exposure to black mold may play in his presentation.  I am not sure how to test for it appropriately.  I will check for labs as ordered.  I recommend referral evaluation with an infectious disease specialist for further evaluation on this matter.  Plan: REVIEW OF HEALTH MAINTENANCE PROTOCOL ORDERS,         Vitamin B12, Adult Infectious Disease         Referral, ibuprofen (ADVIL/MOTRIN) 800 MG         tablet, CANCELED: ESR: Erythrocyte         sedimentation rate, CANCELED: CRP inflammation,        CANCELED: TSH with free T4 reflex            (Z13.6) CARDIOVASCULAR SCREENING; LDL GOAL LESS THAN 130  Comment: Discussed cardiac disease risk factors and cardiac disease risk factor modification.   Plan: REVIEW OF HEALTH MAINTENANCE PROTOCOL ORDERS,         CANCELED: Lipid panel reflex to direct LDL         Fasting, CANCELED: Basic metabolic panel,         CANCELED: AST, CANCELED:  ALT            (Z11.59) Need for hepatitis C screening test  Comment: One time screening test per CDC recommendations.   Plan: REVIEW OF HEALTH MAINTENANCE PROTOCOL ORDERS,         Hepatitis C Screen Reflex to HCV RNA Quant and         Genotype            (Z12.11) Screen for colon cancer  Comment: Discussed current colon cancer screening recommendations.    Colonoscopy as the gold standard for colon cancer screening as this gives opportunity to identify and remove precancerous polyps.  We can send a referral for this procedure as needed.     If colonoscopy not covered or the patient does not want to do this study and the patient is average risk for colon cancer, then I recommended either the ColoGuard stool test every 3 years or the FIT test annually.  I explained that a positive test for either of these tests does not necessarily mean colon cancer, it just means that they will need a more advanced test like colonoscopy.      After discussion with the patient of colon cancer screening options, he would prefer colonoscopy, once he confirms coverage from his insurance.  If colonoscopy is not an option, then he would be agreeable to a Cologuard stool test.    Plan: REVIEW OF HEALTH MAINTENANCE PROTOCOL ORDERS,         Colonoscopy Screening  Referral            (Z77.120) Exposure to mold  Comment: As above, I am not sure what role the mold exposure will be playing in causing his symptoms.  I am not sure how to check for that either and what treatments may or may not be indicated or applicable.  I recommend screening for labs as ordered, including inflammatory markers.  Given the amount of symptoms described by the patient, I recommend consultation with infectious disease specialist for further evaluation and recommendations.  Plan: REVIEW OF HEALTH MAINTENANCE PROTOCOL ORDERS,         Vitamin B12, Adult Infectious Disease         Referral, CANCELED: ESR: Erythrocyte         sedimentation rate,  "CANCELED: CRP inflammation,        CANCELED: TSH with free T4 reflex            (Z13.29) Screening for thyroid disorder  Comment:   Plan: REVIEW OF HEALTH MAINTENANCE PROTOCOL ORDERS,         CANCELED: TSH with free T4 reflex            (M62.830) Spasm of back muscles  Comment:   Plan: REVIEW OF HEALTH MAINTENANCE PROTOCOL ORDERS,         ibuprofen (ADVIL/MOTRIN) 800 MG tablet            (Z11.4) Screening for HIV (human immunodeficiency virus)  Comment: One time screening test per CDC recommendations.   Plan: REVIEW OF HEALTH MAINTENANCE PROTOCOL ORDERS,         HIV Antigen Antibody Combo               Patient has been advised of split billing requirements and indicates understanding: Yes        BMI  Estimated body mass index is 35.13 kg/m  as calculated from the following:    Height as of this encounter: 1.753 m (5' 9\").    Weight as of this encounter: 107.9 kg (237 lb 14.4 oz).       Counseling  Appropriate preventive services were addressed with this patient via screening, questionnaire, or discussion as appropriate for fall prevention, nutrition, physical activity, Tobacco-use cessation, social engagement, weight loss and cognition.  Checklist reviewing preventive services available has been given to the patient.  Reviewed patient's diet, addressing concerns and/or questions.   He is at risk for lack of exercise and has been provided with information to increase physical activity for the benefit of his well-being.           Earlene Snider is a 49 year old, presenting for the following:  Physical        11/12/2024     2:58 PM   Additional Questions   Roomed by Rosette CARRANZA CMA          HPI      1.)  Long history of hyperlipidemia.  Strong history of hyperlipidemia in his family with elevated cholesterol in both parents and all siblings.  He has been recommended to take statin cholesterol-lowering medicine numerous times in the past but has not done so up to this point.  Reviewed his most recent labs " today.    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  He was exposed to black mold when he performing demolition and rebuilding closets and bathrooms in an old house that had leaky pipes.  The mold on site was tested and found to be consistent with black mold.  He was working in that vicinity for at least 4 weeks, he was not wearing respirators or masks.  Since shortly after the mold exposure,  the patient reports that he feels overall fatigued, generalized diffuse body aches and muscle aches, no significant respiratory troubles, no coughing, no hemoptysis, no unintended weight loss.  He wonders about the possibility of mold as a cause for all of the symptoms.        3.) The patient has had a history of obesity.  Reviewed the weigth curves.   Their current BMI is:  Body mass index is 35.13 kg/m .    Reviewed weights in chart:  Wt Readings from Last 10 Encounters:   11/12/24 107.9 kg (237 lb 14.4 oz)   01/08/24 117.9 kg (259 lb 14.4 oz)   06/27/23 114.5 kg (252 lb 6.4 oz)   01/03/23 117.5 kg (259 lb)   12/19/22 117.6 kg (259 lb 3.2 oz)   11/21/22 108.9 kg (240 lb)   10/12/22 108.9 kg (240 lb)   04/05/22 115.2 kg (254 lb)   02/16/22 118.6 kg (261 lb 8 oz)   09/22/21 112 kg (247 lb)          Health Care Directive  Patient does not have a Health Care Directive:       11/12/2024   General Health   How would you rate your overall physical health? (!) FAIR   Feel stress (tense, anxious, or unable to sleep) To some extent      (!) STRESS CONCERN      11/12/2024   Nutrition   Three or more servings of calcium each day? Yes   Diet: Regular (no restrictions)   How many servings of fruit and vegetables per day? (!) 2-3   How many sweetened beverages each day? 0-1            11/12/2024   Exercise   Days per week of moderate/strenous exercise 2 days      (!)  EXERCISE CONCERN      11/12/2024   Social Factors   Frequency of gathering with friends or relatives Once a week   Worry food won't last until get money to buy more Yes   Food not last or not have enough money for food? No   Do you have housing? (Housing is defined as stable permanent housing and does not include staying ouside in a car, in a tent, in an abandoned building, in an overnight shelter, or couch-surfing.) Yes   Are you worried about losing your housing? Yes   Lack of transportation? Yes   Unable to get utilities (heat,electricity)? No   Want help with housing or utility concern? No      (!) FOOD SECURITY CONCERN PRESENT (!) TRANSPORTATION CONCERN PRESENT(!) HOUSING CONCERN PRESENT      11/12/2024   Dental   Dentist two times every year? Yes            11/12/2024   TB Screening   Were you born outside of the [place]? No              11/12/2024   Substance Use   Alcohol more than 3/day or more than 7/wk No   Do you use any other substances recreationally? No        Social History     Tobacco Use    Smoking status: Never    Smokeless tobacco: Never   Substance Use Topics    Alcohol use: No     Alcohol/week: 0.0 standard drinks of alcohol    Drug use: No             11/12/2024   One time HIV Screening   Previous HIV test? Decline          11/12/2024   STI Screening   New sexual partner(s) since last STI/HIV test? (!) DECLINE      ASCVD Risk   The 10-year ASCVD risk score (Ramona CADE, et al., 2019) is: 5.5%    Values used to calculate the score:      Age: 49 years      Sex: Male      Is Non- : No      Diabetic: No      Tobacco smoker: No      Systolic Blood Pressure: 122 mmHg      Is BP treated: No      HDL Cholesterol: 45 mg/dL      Total Cholesterol: 290 mg/dL        11/12/2024   Contraception/Family Planning   Questions about contraception or family planning No           Reviewed and updated as needed this visit by Provider     Meds   Med Hx  Surg Hx  Fam Hx              **I  "reviewed the information recorded in the patient's EPIC chart (including but not limited to medical history, surgical history, family history, problem list, medication list, and allergy list) and updated the information as indicated based on the patients reported information.         Review of Systems  Constitutional, neuro, ENT, endocrine, pulmonary, cardiac, gastrointestinal, genitourinary, musculoskeletal, integument and psychiatric systems are negative, except as otherwise noted.     Objective    Exam  /76   Pulse 78   Temp 98.4  F (36.9  C) (Tympanic)   Resp 14   Ht 1.753 m (5' 9\")   Wt 107.9 kg (237 lb 14.4 oz)   SpO2 98%   BMI 35.13 kg/m     Estimated body mass index is 35.13 kg/m  as calculated from the following:    Height as of this encounter: 1.753 m (5' 9\").    Weight as of this encounter: 107.9 kg (237 lb 14.4 oz).    Physical Exam  GENERAL alert and no distress  EYES:  Normal sclera,conjunctiva, EOMI  HENT: oral and posterior pharynx without lesions or erythema, facies symmetric  NECK: Neck supple. No LAD, without thyroidmegaly.  RESP: Clear to ausculation bilaterally without wheezes or crackles. Normal BS in all fields.  CV: RRR normal S1S2 without murmurs, rubs or gallops.  LYMPH: no cervical lymph adenopathy appreciated  MS: extremities- no gross deformities of the visible extremities noted,   EXT:  no lower extremity edema  PSYCH: Alert and oriented times 3; speech- coherent  SKIN:  No obvious significant skin lesions on visible portions of face         Signed Electronically by: Urban Stallings MD    "

## 2024-11-12 NOTE — PATIENT INSTRUCTIONS
"   Plan to get the annual influenza vaccine each fall for sure by the end of October for the best protection throughout the winter flu season.   Get this from any pharmacy or flu shot clinic.       Plan to get an updated Covid booster each fall at least by the end of October for the best protection throughout the winter season.   Get this from any pharmacy or Covid vaccine clinic.        You are due for a tetanus vaccine every 10 years and your last one was approximately 2010 based on information you gave us.       Referral to infectious disease clinic to evaluate your concerns about mold exposure      [facility] [address]   [place] [address]   Phone: [phone]             Start rosuvastatin for more aggressive cardiac risk factor modification because of persistently elevated cholesterol levels.     --Start rosuvastatin 20 mg once per day, usually best tolerated if taken at bedtime, but anytime works.     --Main side effects of this class of medication include, but are not limited to are new or worsened muscle aching or intestinal upset.  Stop the medication and contact us if any concerns for side effects occur.       --The nutritional supplement CoQ10 200 mg per day can help muscle pains and also help reduce the chance of musculoskeletal and soft tissue side effects from \"statin\" cholesterol lowering medications.  You can take any over the counter version and find at any grocery or drug store.  The cheapest prices are usually found at [email or web address] or [facility].        Return for a \"lab only appointment\" in approximately 2-3 months, or before the end of the first bottle.  Please get these labs done in a fasting state with nothing to eat for at least 8 hours prior to getting labs drawn.  I will make contact regarding the results and any recommendations once I have reviewed them.          Return to see me in 1 year, schedule a follow up visit sooner if needed for anything else.  " "Please get fasting labs done at the [facility] or any other [facility] lab 1-2 days before this appointment (schedule a \"lab appointment\" for this).  If you get the labs done at another [facility], make arrangements with them directly.  The orders will be in place.  Eat nothing for at least 8 hours prior to having these labs drawn.  Use [employer] or Call [phone] to schedule the appointment with me and lab appointment.           COLON CANCER SCREENING:     *  All men and women are recommended to have some sort of formal colon cancer screening starting at age of 45 (or earlier if indicated based on family history of colon cancer).     *  Colon cancer is a preventable type of cancer that if caught early can be easily treated even before it becomes cancer by removing the precancerous.      *  Common Colon cancer screening options:     --Colonoscopy (every 10 years if normal exam, no family history of colon cancer)  I place order and you will be contacted to arrange this procedure.   Pros: most complete and thorough exam, identify and remove any pre-cancerous polyps.   Cons: prep before procedure, sedation required, possible cost     --ColoGuard Stool test every 3 years (be sure to confirm coverage by insurance).  This test checks for colon cancer specific DNA in the stool.  You will receive the collection kit in the mail.  Return the samples via mail.  If positive, you do not necessarily have colon cancer, but will need a colonoscopy.  Most insurance cover this test, but please check with your insurance to confirm this.    Visit their web site for more details:  [email or web address]  Pros: easy to collect and return, decent specific screening test, newer test  Cons: cost (if not covered by insurance)     --\"FIT\" Stool test once per year.    Return the \"FIT\" stool test to us via mail.  This is a basic level screening for colon cancer by detecting trace amount of occult blood in the " "intestinal tract.  If the FIT test shows any traces of occult blood, it does NOT necessarily mean that you have colon cancer, it just means that we should probably consider doing the colonoscopy.   Pros: easy to collect, cheap  Cons: not very specific, high false positive rate        COLONOSCOPY:     *  All patients over age 50 are recommended to have formal colon cancer screening (starting in the early 40's if there is a family history of colon cancer, 1 first degree relative or 2 second degree relatives)  *  I would recommend a colonoscopy at [facility] ([facility])  Phone: [phone] for colon cancer.  I will place the order and they will contact you to arrange this at your convenience.    If you have not been contacted by them within a week, then you can contact them.  *  Colonoscopy is the gold standard recommended procedure for colon cancer screening.    *  If the colonoscopy is normal and no family history of colon cancer, then repeat colonoscopy every 10 years.   *  Colonoscopy recommended every 5 years with any family history of colon cancer, regardless of the findings on your colonoscopy.  *  Colonoscopy may need to be done at shorter intervals if any pre-cancerous polyps are found.        [facility] ([facility]  [address] [place] [address]  Phone: [phone]  Fax: [phone]         5 GOALS TO PREVENT VASCULAR DISEASE:     1.  Aggressive blood pressure control, under 130/80 ideally.  Using medications if needed.    Your blood pressure is under good control    BP Readings from Last 4 Encounters:   11/12/24 122/76   01/08/24 122/76   06/27/23 126/80   01/03/23 120/83       2.  Aggressive LDL cholesterol (\"bad cholesterol\") lowering as indicated.    Your goal is an LDL under 130 for sure, preferably under 100.  (If you have diabetes or previous vascular disease, the the LDL goals would be under 100 for sure, preferably under 70.)    New " "guidelines identify four high-risk groups who could benefit from statins:   *people with pre-existing heart disease, such as those who have had a heart attack;   *people ages 40 to 75 who have diabetes of any type  *patients ages 40 to 75 with at least a 7.5% risk of developing cardiovascular disease over the next decade, according to a formula described in the guidelines  *patients with the sort of super-high cholesterol that sometimes runs in families, as evidenced by an LDL of 190 milligrams per deciliter or higher    Your cholesterol levels are well controlled.    Recent Labs   Lab Test 02/16/22  0937 06/29/21  1341   CHOL 290* 263*   HDL 45 48   * 182*   TRIG 193* 165*       --LDL (\"bad\" cholesterol): normal under 130, ideal under 100.  Best way to lower this is through better food choices ( lower fats, etc.), medication may be considered if indicated  --HDL (\"good\") cholesterol: (normal above 40 for men, above 50 for women).  Best way to improve the HDL level is through regular physical exercise.  There are no medications to raise HDL levels.   --Triglycerides (desirable under 150): triglycerides are more about metabolic issues, best way to improve this is through better diet choices, emphasizing reducing the intake of \"simple carbohydrates\" (e.g. White bread, white rice, pasta, noodles, potatoes, snack foods, regular soda, juices (except fresh squeezed), cakes, cookies, candy, etc.) as best possible. Medications may be considered for triglyceride levels routinely above 400.    3.  Aggressive diabetic prevention, screening and/or management.      You do not have diabetes as of the most recent blood tests.     4.  No smoking    5.  Consider daily preventative aspirin over age 50 if you have enough cardiac risk factors to place you at higher risk for the presence of vascular disease.    If you have any reason not to take aspirin such easy bruising or bleeding, stomach problems, other anticoagulant " medications, or any other side effects, then you should not take Aspirin.     --Based on your current cardiac disease risk profile and/or age over 75, you do NOT need to take daily preventative aspirin.

## 2024-11-13 LAB — HIV 1+2 AB+HIV1 P24 AG SERPL QL IA: NONREACTIVE

## 2025-06-02 NOTE — PATIENT INSTRUCTIONS
FOLLOW-UP VISIT NOTE           HISTORY OF PRESENT ILLNESS       Franco Mcfadden is a 70 year old male presenting for followup regarding right shoulder.  Since getting aggressive his motion is much better.  Still has achiness and soreness.    PHYSICAL EXAM      Visit Vitals  Ht 5' 9\" (1.753 m)   Wt 72.1 kg (158 lb 15.2 oz)   BMI 23.47 kg/m²      Range of motion is much improved.  There is mild weakness.    STUDIES     None    ASSESSMENT/PLAN      IMPRESSION:   Status postarthroscopy right shoulder    TREATMENT AND RECOMMENDATIONS:   At this point we will add work hardening conditioning.  Modified work note was given.  Follow-up in 6 weeks.  10-minute total care time    Cisco Holloway DO, FELIX, FAAOS, FAANA     "*  I am not sure what is causing the frequent urination and frequent bowel movements.  Because there are two separate organ systems involved, the completely normal labs, and lack of any other significant symptoms over the past year and the solid stools, I am not suspecting any serious systemic illness that is causing this    *  I am most suspicious of a \"functional issue\" related to stress.     *  Keep caffeine intake under 2 cups per day    *  Consdier a fiber supplement of your choosing.     *  Consider any food intolerances that could be contributing to these symptoms such as dairy, fats, artifical sweetners, carbonated beverages.      *  There are medications that can be used to calm down an overactive bladder, but these have a lot of side effects.       Dermatology to evaluate your dry skin if you cannot make this better with moisturizing shampoo for the scalp and moisturizers for the skin. .    --[facility] [place] [phone]   [email or web address]    --[facility] [phone]   [email or web address]      "